# Patient Record
Sex: FEMALE | Race: WHITE | Employment: OTHER | URBAN - METROPOLITAN AREA
[De-identification: names, ages, dates, MRNs, and addresses within clinical notes are randomized per-mention and may not be internally consistent; named-entity substitution may affect disease eponyms.]

---

## 2018-02-23 ENCOUNTER — HOSPITAL ENCOUNTER (OUTPATIENT)
Dept: MAMMOGRAPHY | Age: 58
Discharge: HOME | End: 2018-02-23
Attending: FAMILY MEDICINE
Payer: COMMERCIAL

## 2018-02-23 DIAGNOSIS — Z12.31 VISIT FOR SCREENING MAMMOGRAM: ICD-10-CM

## 2018-02-23 PROCEDURE — 77063 BREAST TOMOSYNTHESIS BI: CPT

## 2018-07-11 ENCOUNTER — HOSPITAL ENCOUNTER (EMERGENCY)
Age: 58
Discharge: HOME | End: 2018-07-12
Attending: EMERGENCY MEDICINE
Payer: COMMERCIAL

## 2018-07-11 ENCOUNTER — APPOINTMENT (OUTPATIENT)
Dept: CT IMAGING | Age: 58
End: 2018-07-11
Attending: EMERGENCY MEDICINE
Payer: COMMERCIAL

## 2018-07-11 VITALS
SYSTOLIC BLOOD PRESSURE: 153 MMHG | OXYGEN SATURATION: 98 % | HEART RATE: 90 BPM | DIASTOLIC BLOOD PRESSURE: 85 MMHG | RESPIRATION RATE: 16 BRPM | TEMPERATURE: 97.9 F

## 2018-07-11 DIAGNOSIS — K63.9 LESION OF CECUM: ICD-10-CM

## 2018-07-11 DIAGNOSIS — H53.8 BLURRED VISION, BILATERAL: Primary | ICD-10-CM

## 2018-07-11 DIAGNOSIS — G89.29 ABDOMINAL PAIN, CHRONIC, RIGHT LOWER QUADRANT: ICD-10-CM

## 2018-07-11 DIAGNOSIS — R73.9 ACUTE HYPERGLYCEMIA: ICD-10-CM

## 2018-07-11 DIAGNOSIS — R10.31 ABDOMINAL PAIN, CHRONIC, RIGHT LOWER QUADRANT: ICD-10-CM

## 2018-07-11 LAB
ALBUMIN SERPL-MCNC: 3.8 G/DL (ref 3.4–5)
ALBUMIN/GLOB SERPL: 1 {RATIO} (ref 0.8–1.7)
ALP SERPL-CCNC: 74 U/L (ref 45–117)
ALT SERPL-CCNC: 50 U/L (ref 13–56)
ANION GAP SERPL CALC-SCNC: 10 MMOL/L (ref 3–18)
APPEARANCE UR: CLEAR
AST SERPL-CCNC: 15 U/L (ref 15–37)
BASOPHILS # BLD: 0 K/UL (ref 0–0.1)
BASOPHILS NFR BLD: 1 % (ref 0–2)
BILIRUB SERPL-MCNC: 0.3 MG/DL (ref 0.2–1)
BILIRUB UR QL: NEGATIVE
BUN SERPL-MCNC: 22 MG/DL (ref 7–18)
BUN/CREAT SERPL: 17 (ref 12–20)
CALCIUM SERPL-MCNC: 9.4 MG/DL (ref 8.5–10.1)
CHLORIDE SERPL-SCNC: 96 MMOL/L (ref 100–108)
CK MB CFR SERPL CALC: 1.3 % (ref 0–4)
CK MB SERPL-MCNC: 1.2 NG/ML (ref 5–25)
CK SERPL-CCNC: 96 U/L (ref 26–192)
CO2 SERPL-SCNC: 29 MMOL/L (ref 21–32)
COLOR UR: YELLOW
CREAT SERPL-MCNC: 1.33 MG/DL (ref 0.6–1.3)
DIFFERENTIAL METHOD BLD: NORMAL
EOSINOPHIL # BLD: 0.2 K/UL (ref 0–0.4)
EOSINOPHIL NFR BLD: 2 % (ref 0–5)
ERYTHROCYTE [DISTWIDTH] IN BLOOD BY AUTOMATED COUNT: 12.5 % (ref 11.6–14.5)
GLOBULIN SER CALC-MCNC: 3.9 G/DL (ref 2–4)
GLUCOSE BLD STRIP.AUTO-MCNC: 199 MG/DL (ref 70–110)
GLUCOSE SERPL-MCNC: 346 MG/DL (ref 74–99)
GLUCOSE UR STRIP.AUTO-MCNC: >1000 MG/DL
HCT VFR BLD AUTO: 41.9 % (ref 35–45)
HGB BLD-MCNC: 14.4 G/DL (ref 12–16)
HGB UR QL STRIP: NEGATIVE
KETONES UR QL STRIP.AUTO: NEGATIVE MG/DL
LEUKOCYTE ESTERASE UR QL STRIP.AUTO: NEGATIVE
LIPASE SERPL-CCNC: 889 U/L (ref 73–393)
LYMPHOCYTES # BLD: 3.2 K/UL (ref 0.9–3.6)
LYMPHOCYTES NFR BLD: 39 % (ref 21–52)
MCH RBC QN AUTO: 29.6 PG (ref 24–34)
MCHC RBC AUTO-ENTMCNC: 34.4 G/DL (ref 31–37)
MCV RBC AUTO: 86 FL (ref 74–97)
MONOCYTES # BLD: 0.5 K/UL (ref 0.05–1.2)
MONOCYTES NFR BLD: 6 % (ref 3–10)
NEUTS SEG # BLD: 4.4 K/UL (ref 1.8–8)
NEUTS SEG NFR BLD: 52 % (ref 40–73)
NITRITE UR QL STRIP.AUTO: NEGATIVE
PH UR STRIP: 5.5 [PH] (ref 5–8)
PLATELET # BLD AUTO: 257 K/UL (ref 135–420)
PMV BLD AUTO: 9.7 FL (ref 9.2–11.8)
POTASSIUM SERPL-SCNC: 3.7 MMOL/L (ref 3.5–5.5)
PROT SERPL-MCNC: 7.7 G/DL (ref 6.4–8.2)
PROT UR STRIP-MCNC: NEGATIVE MG/DL
RBC # BLD AUTO: 4.87 M/UL (ref 4.2–5.3)
SODIUM SERPL-SCNC: 135 MMOL/L (ref 136–145)
SP GR UR REFRACTOMETRY: 1.01 (ref 1–1.03)
TROPONIN I SERPL-MCNC: <0.02 NG/ML (ref 0–0.04)
TSH SERPL DL<=0.05 MIU/L-ACNC: 3.3 UIU/ML (ref 0.36–3.74)
UROBILINOGEN UR QL STRIP.AUTO: 0.2 EU/DL (ref 0.2–1)
WBC # BLD AUTO: 8.3 K/UL (ref 4.6–13.2)

## 2018-07-11 PROCEDURE — 74011636320 HC RX REV CODE- 636/320: Performed by: EMERGENCY MEDICINE

## 2018-07-11 PROCEDURE — 96361 HYDRATE IV INFUSION ADD-ON: CPT

## 2018-07-11 PROCEDURE — 93005 ELECTROCARDIOGRAM TRACING: CPT

## 2018-07-11 PROCEDURE — 83690 ASSAY OF LIPASE: CPT | Performed by: EMERGENCY MEDICINE

## 2018-07-11 PROCEDURE — 84443 ASSAY THYROID STIM HORMONE: CPT | Performed by: EMERGENCY MEDICINE

## 2018-07-11 PROCEDURE — 99284 EMERGENCY DEPT VISIT MOD MDM: CPT

## 2018-07-11 PROCEDURE — 96375 TX/PRO/DX INJ NEW DRUG ADDON: CPT

## 2018-07-11 PROCEDURE — 85025 COMPLETE CBC W/AUTO DIFF WBC: CPT | Performed by: EMERGENCY MEDICINE

## 2018-07-11 PROCEDURE — 74177 CT ABD & PELVIS W/CONTRAST: CPT

## 2018-07-11 PROCEDURE — 74011250636 HC RX REV CODE- 250/636: Performed by: EMERGENCY MEDICINE

## 2018-07-11 PROCEDURE — 74011636637 HC RX REV CODE- 636/637: Performed by: EMERGENCY MEDICINE

## 2018-07-11 PROCEDURE — 82962 GLUCOSE BLOOD TEST: CPT

## 2018-07-11 PROCEDURE — 80053 COMPREHEN METABOLIC PANEL: CPT | Performed by: EMERGENCY MEDICINE

## 2018-07-11 PROCEDURE — 74011000250 HC RX REV CODE- 250: Performed by: EMERGENCY MEDICINE

## 2018-07-11 PROCEDURE — 82550 ASSAY OF CK (CPK): CPT | Performed by: EMERGENCY MEDICINE

## 2018-07-11 PROCEDURE — 96374 THER/PROPH/DIAG INJ IV PUSH: CPT

## 2018-07-11 PROCEDURE — 81003 URINALYSIS AUTO W/O SCOPE: CPT | Performed by: EMERGENCY MEDICINE

## 2018-07-11 RX ORDER — GLIMEPIRIDE 4 MG/1
4 TABLET ORAL 2 TIMES DAILY
COMMUNITY
End: 2018-08-14

## 2018-07-11 RX ORDER — ONDANSETRON 2 MG/ML
4 INJECTION INTRAMUSCULAR; INTRAVENOUS
Status: COMPLETED | OUTPATIENT
Start: 2018-07-11 | End: 2018-07-11

## 2018-07-11 RX ORDER — LOSARTAN POTASSIUM AND HYDROCHLOROTHIAZIDE 12.5; 5 MG/1; MG/1
1 TABLET ORAL DAILY
COMMUNITY

## 2018-07-11 RX ORDER — FUROSEMIDE 40 MG/1
40 TABLET ORAL DAILY
COMMUNITY

## 2018-07-11 RX ORDER — FAMOTIDINE 10 MG/ML
20 INJECTION INTRAVENOUS
Status: COMPLETED | OUTPATIENT
Start: 2018-07-11 | End: 2018-07-11

## 2018-07-11 RX ADMIN — ONDANSETRON 4 MG: 2 INJECTION, SOLUTION INTRAMUSCULAR; INTRAVENOUS at 21:22

## 2018-07-11 RX ADMIN — SODIUM CHLORIDE 1000 ML: 900 INJECTION, SOLUTION INTRAVENOUS at 23:15

## 2018-07-11 RX ADMIN — INSULIN HUMAN 4 UNITS: 100 INJECTION, SOLUTION PARENTERAL at 22:41

## 2018-07-11 RX ADMIN — SODIUM CHLORIDE 1000 ML: 900 INJECTION, SOLUTION INTRAVENOUS at 21:21

## 2018-07-11 RX ADMIN — FAMOTIDINE 20 MG: 10 INJECTION, SOLUTION INTRAVENOUS at 21:22

## 2018-07-11 RX ADMIN — IOPAMIDOL 80 ML: 612 INJECTION, SOLUTION INTRAVENOUS at 22:53

## 2018-07-12 LAB
ATRIAL RATE: 79 BPM
CALCULATED P AXIS, ECG09: 17 DEGREES
CALCULATED R AXIS, ECG10: 27 DEGREES
CALCULATED T AXIS, ECG11: 46 DEGREES
DIAGNOSIS, 93000: NORMAL
P-R INTERVAL, ECG05: 132 MS
Q-T INTERVAL, ECG07: 372 MS
QRS DURATION, ECG06: 82 MS
QTC CALCULATION (BEZET), ECG08: 426 MS
VENTRICULAR RATE, ECG03: 79 BPM

## 2018-07-12 NOTE — ED TRIAGE NOTES
Pt arrived through triage with c/o blurred vision since Friday and abdominal pain that has been going on \"for months. \"

## 2018-07-12 NOTE — ED NOTES
I have reviewed discharge instruction and prescriptions with patient. Patient verbalized understanding and has no further questions at this time. Education taught and patient verbalized understanding of education. Teach back method used. Right ac IV removed, catheter tip intact on removal.  Armband removed and shredded per patients request.    Patients pain 1/10. Belongings given to patient. Patient discharged with spouse to home.

## 2018-07-12 NOTE — ED PROVIDER NOTES
HPI Comments: Cornell Bentley is a 62 y.o. Female with h/o niddm with c/o continued bilateral blurred vision for last 4 days with persistent nausea and daily diarrhea. Pt with polyuria, and chronic right lower abd pain (last several weeks with no imaging resulting in diff to get her right leg up on bed everyday. No syncope. Also with intermittent left sided chest pain as well. No recent gupta. Had similar issue with her vision prior to started meds for dm but cleared up once she got her glucose down The history is provided by the patient and medical records. Past Medical History:  
Diagnosis Date  Diabetes ([facility] [address]  Hypertension  Seizures ([facility] [address] Past Surgical History:  
Procedure Laterality Date  HX TUBAL LIGATION No family history on file. Social History Social History  Marital status: SINGLE Spouse name: N/A  
 Number of children: N/A  
 Years of education: N/A Occupational History  Not on file. Social History Main Topics  Smoking status: Former Smoker  Smokeless tobacco: Not on file  Alcohol use No  
 Drug use: No  
 Sexual activity: Not on file Other Topics Concern  Not on file Social History Narrative ALLERGIES: Ciprofloxacin; Penicillins; and Percocet [oxycodone-acetaminophen] Review of Systems Constitutional: Positive for chills and fatigue. Negative for fever. HENT: Negative for sore throat and trouble swallowing. Eyes: Positive for visual disturbance. Negative for discharge and redness. Respiratory: Negative for cough. Cardiovascular: Positive for chest pain. Gastrointestinal: Positive for abdominal pain. Negative for blood in stool. Endocrine: Positive for polyuria. Genitourinary: Positive for frequency. Negative for difficulty urinating. Musculoskeletal: Negative for gait problem. Skin: Negative for rash. Allergic/Immunologic: Negative for immunocompromised state.   
Neurological: Positive for light-headedness. Psychiatric/Behavioral: Negative for sleep disturbance. Vitals:  
 07/11/18 2020 BP: 153/85 Pulse: 90 Resp: 16 Temp: 97.9 °F (36.6 °C) SpO2: 98% Physical Exam  
Constitutional: She is oriented to person, place, and time. She appears well-developed and well-nourished. No distress. HENT:  
Head: Normocephalic and atraumatic. Right Ear: External ear normal.  
Left Ear: External ear normal.  
Nose: Nose normal.  
Mouth/Throat: Uvula is midline, oropharynx is clear and moist and mucous membranes are normal.  
Eyes: Conjunctivae are normal. No scleral icterus. Neck: Neck supple. Cardiovascular: Normal rate, regular rhythm, normal heart sounds and intact distal pulses. Pulmonary/Chest: Effort normal and breath sounds normal.  
Abdominal: Soft. She exhibits no distension and no mass. There is no hepatosplenomegaly or hepatomegaly. There is tenderness. There is no rigidity, no rebound, no guarding and no CVA tenderness. Obese Musculoskeletal: She exhibits no edema. Neurological: She is alert and oriented to person, place, and time. Gait normal.  
Skin: Skin is warm and dry. She is not diaphoretic. Psychiatric: Her behavior is normal.  
Nursing note and vitals reviewed. [facility] 
 
 
ED Course Procedures Vitals: 
Patient Vitals for the past 12 hrs: 
 Temp Pulse Resp BP SpO2  
07/11/18 2020 97.9 °F (36.6 °C) 90 16 153/85 98 % Medications ordered:  
Medications  
sodium chloride 0.9 % bolus infusion 1,000 mL (not administered)  
sodium chloride 0.9 % bolus infusion 1,000 mL (1,000 mL IntraVENous New Bag 7/11/18 2121) ondansetron [facility]) injection 4 mg (4 mg IntraVENous Given 7/11/18 2122)  
famotidine (PF) (PEPCID) injection 20 mg (20 mg IntraVENous Given 7/11/18 2122) insulin regular (NOVOLIN R, HUMULIN R) injection 4 Units (4 Units IntraVENous Given 7/11/18 2241) iopamidol (ISOVUE 300) 61 % contrast injection 100 mL (80 mL IntraVENous Given 7/11/18 0597) Lab findings: 
Recent Results (from the past 12 hour(s)) URINALYSIS W/ RFLX MICROSCOPIC Collection Time: 07/11/18  8:40 PM  
Result Value Ref Range Color YELLOW Appearance CLEAR Specific gravity 1.013 1.005 - 1.030    
 pH (UA) 5.5 5.0 - 8.0 Protein NEGATIVE  NEG mg/dL Glucose >1000 (A) NEG mg/dL Ketone NEGATIVE  NEG mg/dL Bilirubin NEGATIVE  NEG Blood NEGATIVE  NEG Urobilinogen 0.2 0.2 - 1.0 EU/dL Nitrites NEGATIVE  NEG Leukocyte Esterase NEGATIVE  NEG    
CBC WITH AUTOMATED DIFF Collection Time: 07/11/18  9:07 PM  
Result Value Ref Range WBC 8.3 4.6 - 13.2 K/uL  
 RBC 4.87 4.20 - 5.30 M/uL  
 HGB 14.4 12.0 - 16.0 g/dL HCT 41.9 35.0 - 45.0 % MCV 86.0 74.0 - 97.0 FL  
 MCH 29.6 24.0 - 34.0 PG  
 MCHC 34.4 31.0 - 37.0 g/dL  
 RDW 12.5 11.6 - 14.5 % PLATELET 287 457 - 126 K/uL MPV 9.7 9.2 - 11.8 FL  
 NEUTROPHILS 52 40 - 73 % LYMPHOCYTES 39 21 - 52 % MONOCYTES 6 3 - 10 % EOSINOPHILS 2 0 - 5 % BASOPHILS 1 0 - 2 %  
 ABS. NEUTROPHILS 4.4 1.8 - 8.0 K/UL  
 ABS. LYMPHOCYTES 3.2 0.9 - 3.6 K/UL  
 ABS. MONOCYTES 0.5 0.05 - 1.2 K/UL  
 ABS. EOSINOPHILS 0.2 0.0 - 0.4 K/UL  
 ABS. BASOPHILS 0.0 0.0 - 0.1 K/UL  
 DF AUTOMATED METABOLIC PANEL, COMPREHENSIVE Collection Time: 07/11/18  9:07 PM  
Result Value Ref Range Sodium 135 (L) 136 - 145 mmol/L Potassium 3.7 3.5 - 5.5 mmol/L Chloride 96 (L) 100 - 108 mmol/L  
 CO2 29 21 - 32 mmol/L Anion gap 10 3.0 - 18 mmol/L Glucose 346 (H) 74 - 99 mg/dL BUN 22 (H) 7.0 - 18 MG/DL Creatinine 1.33 (H) 0.6 - 1.3 MG/DL  
 BUN/Creatinine ratio 17 12 - 20 GFR est AA 50 (L) >60 ml/min/1.73m2 GFR est non-AA 41 (L) >60 ml/min/1.73m2 Calcium 9.4 8.5 - 10.1 MG/DL Bilirubin, total 0.3 0.2 - 1.0 MG/DL  
 ALT (SGPT) 50 13 - 56 U/L  
 AST (SGOT) 15 15 - 37 U/L Alk. phosphatase 74 45 - 117 U/L Protein, total 7.7 6.4 - 8.2 g/dL  Albumin 3.8 3.4 - 5.0 g/dL Globulin 3.9 2.0 - 4.0 g/dL A-G Ratio 1.0 0.8 - 1.7 LIPASE Collection Time: 07/11/18  9:07 PM  
Result Value Ref Range Lipase 889 (H) 73 - 393 U/L  
CARDIAC PANEL,(CK, CKMB & TROPONIN) Collection Time: 07/11/18  9:07 PM  
Result Value Ref Range CK 96 26 - 192 U/L  
 CK - MB 1.2 <3.6 ng/ml CK-MB Index 1.3 0.0 - 4.0 % Troponin-I, Qt. <0.02 0.0 - 0.045 NG/ML  
TSH 3RD GENERATION Collection Time: 07/11/18  9:07 PM  
Result Value Ref Range TSH 3.30 0.36 - 3.74 uIU/mL EKG, 12 LEAD, INITIAL Collection Time: 07/11/18  9:32 PM  
Result Value Ref Range Ventricular Rate 79 BPM  
 Atrial Rate 79 BPM  
 P-R Interval 132 ms QRS Duration 82 ms Q-T Interval 372 ms QTC Calculation (Bezet) 426 ms Calculated P Axis 17 degrees Calculated R Axis 27 degrees Calculated T Axis 46 degrees Diagnosis Normal sinus rhythm Normal ECG When compared with ECG of 04-OCT-2016 18:21, No significant change was found GLUCOSE, POC Collection Time: 07/11/18 11:44 PM  
Result Value Ref Range Glucose (POC) 199 (H) 70 - 110 mg/dL EKG interpretation by ED Physician: 
nsr with no acute st tw changes Rate 79, pr 132, qtc 426 No sig change from previous X-Ray, CT or other radiology findings or impressions: 
CT ABD PELV W CONT    (Results Pending) Appendix not visualized but no sec changes. There is abnl appearance to cecum without mass Progress notes, Consult notes or additional Procedure notes:  
Doubt need for other work here, abx. Suspect her vision due to chronic uncontrolled hyperglycemia Aware of recent colonoscopy but pt needs to f/u back with gi for possible repeat endoscopy to see if bx needed in that area Pt with appt with endo upcoming I have discussed with patient and/or family/sig other the results, interpretation of any imaging if performed, suspected diagnosis and treatment plan to include instructions regarding the diagnoses listed to which understanding was expressed with all questions answered Reevaluation of patient:  
stable Disposition: 
Diagnosis: 1. Blurred vision, bilateral   
2. Acute hyperglycemia 3. Abdominal pain, chronic, right lower quadrant 4. Lesion of cecum Disposition: home Follow-up Information Follow up With Details Comments Contact Sussy Almaguer MD Schedule an appointment as soon as possible for a visit for follow up on your diabetes within next week [address] [facility] 
[phone] 
  
 make an appointment with your eye doctor within the next week for recheck of your eyes     
 call your GI doctor in the morning to arrange recheck in the office for follow up on the abnormal appearance of your cecum on the CT scan Patient's Medications Start Taking No medications on file Continue Taking FUROSEMIDE (LASIX) 40 MG TABLET    Take 40 mg by mouth daily. Indications: Edema, hypertension GLIMEPIRIDE (AMARYL) 4 MG TABLET    Take 4 mg by mouth two (2) times a day. LOSARTAN-HYDROCHLOROTHIAZIDE (HYZAAR) 50-12.5 MG PER TABLET    Take 1 Tab by mouth daily. Indications: hypertension SITAGLIPTIN-METFORMIN (JANUMET) 50-1,000 MG PER TABLET    Take 1 Tab by mouth two (2) times daily (with meals). These Medications have changed No medications on file Stop Taking No medications on file

## 2018-07-31 ENCOUNTER — HOSPITAL ENCOUNTER (OUTPATIENT)
Dept: MRI IMAGING | Age: 58
Discharge: HOME | End: 2018-07-31
Attending: FAMILY MEDICINE

## 2018-07-31 DIAGNOSIS — R29.898 WEAKNESS OF RIGHT LEG: ICD-10-CM

## 2018-08-02 ENCOUNTER — HOSPITAL ENCOUNTER (OUTPATIENT)
Dept: MRI IMAGING | Age: 58
Discharge: HOME | End: 2018-08-02
Attending: FAMILY MEDICINE
Payer: COMMERCIAL

## 2018-08-02 PROCEDURE — 72148 MRI LUMBAR SPINE W/O DYE: CPT

## 2018-08-14 ENCOUNTER — HOSPITAL ENCOUNTER (OUTPATIENT)
Dept: PREADMISSION TESTING | Age: 58
Discharge: HOME | End: 2018-08-14
Payer: COMMERCIAL

## 2018-08-14 ENCOUNTER — HOSPITAL ENCOUNTER (OUTPATIENT)
Dept: LAB | Age: 58
Discharge: HOME | End: 2018-08-14
Payer: COMMERCIAL

## 2018-08-14 DIAGNOSIS — Z01.818 PRE-OP TESTING: ICD-10-CM

## 2018-08-14 LAB
ABO + RH BLD: NORMAL
BLOOD GROUP ANTIBODIES SERPL: NORMAL
SENTARA SPECIMEN COL,SENBCF: NORMAL
SPECIMEN EXP DATE BLD: NORMAL

## 2018-08-14 PROCEDURE — 86900 BLOOD TYPING SEROLOGIC ABO: CPT | Performed by: OBSTETRICS & GYNECOLOGY

## 2018-08-14 PROCEDURE — 99001 SPECIMEN HANDLING PT-LAB: CPT | Performed by: OBSTETRICS & GYNECOLOGY

## 2018-08-14 RX ORDER — SODIUM CHLORIDE 0.9 % (FLUSH) 0.9 %
5-10 SYRINGE (ML) INJECTION EVERY 8 HOURS
Status: CANCELLED | OUTPATIENT
Start: 2018-08-14

## 2018-08-14 RX ORDER — SODIUM CHLORIDE, SODIUM LACTATE, POTASSIUM CHLORIDE, CALCIUM CHLORIDE 600; 310; 30; 20 MG/100ML; MG/100ML; MG/100ML; MG/100ML
150 INJECTION, SOLUTION INTRAVENOUS CONTINUOUS
Status: CANCELLED | OUTPATIENT
Start: 2018-08-22 | End: 2018-08-23

## 2018-08-14 RX ORDER — METFORMIN HYDROCHLORIDE 1000 MG/1
1000 TABLET ORAL 2 TIMES DAILY WITH MEALS
COMMUNITY

## 2018-08-14 RX ORDER — SODIUM CHLORIDE 0.9 % (FLUSH) 0.9 %
5-10 SYRINGE (ML) INJECTION AS NEEDED
Status: CANCELLED | OUTPATIENT
Start: 2018-08-14

## 2018-08-14 NOTE — PERIOP NOTES
PAT - SURGICAL PRE-ADMISSION INSTRUCTIONS    NAME:  Tamica Choe                                                          TODAY'S DATE:  8/14/2018    SURGERY DATE:  8/22/2018                                  SURGERY ARRIVAL TIME:   0545    1. Do NOT eat or drink anything, including candy or gum, after MIDNIGHT on 8/21/18 , unless you have specific instructions from your Surgeon or Anesthesia Provider to do so. 2. No smoking on the day of surgery. 3. No alcohol 24 hours prior to the day of surgery. 4. No recreational drugs for one week prior to the day of surgery. 5. Leave all valuables, including money/purse, at home. 6. Remove all jewelry, nail polish, makeup (including mascara); no lotions, powders, deodorant, or perfume/cologne/after shave. 7. Glasses/Contact lenses and Dentures may be worn to the hospital.  They will be removed prior to surgery. 8. Call your doctor if symptoms of a cold or illness develop within 24 ours prior to surgery. 9. AN ADULT MUST DRIVE YOU HOME AFTER OUTPATIENT SURGERY. 10. If you are having an OUTPATIENT procedure, please make arrangements for a responsible adult to be with you for 24 hours after your surgery. 11. If you are admitted to the hospital, you will be assigned to a bed after surgery is complete. Normally a family member will not be able to see you until you are in your assigned bed. 15. Family is encouraged to accompany you to the hospital.  We ask visitors in the treatment area to be limited to ONE person at a time to ensure patient privacy. EXCEPTIONS WILL BE MADE AS NEEDED. 15. Children under 12 are discouraged from entering the treatment area and need to be supervised by an adult when in the waiting room. Special Instructions:    HOLD oral diabetic medication on the MORNING OF surgery. , HOLD metformin/glucophage dose starting the EVENING BEFORE the day of surgery.     Patient Prep:    use CHG solution    These surgical instructions were reviewed with patient during the PAT visit. A printed copy of the instructions was provided to patient. Directions: On the morning of surgery, please go to the [facility]. Enter the building from the [facility] entrance, 1st floor (next to the Emergency Room entrance). Take the elevator to the 2nd floor. Sign in at the Registration Desk.     If you have any questions and/or concerns, please do not hesitate to call:  (Prior to the day of surgery)  [facility] unit:  [phone]  (Day of surgery)  [facility] unit:  [phone]

## 2018-08-21 ENCOUNTER — ANESTHESIA EVENT (OUTPATIENT)
Dept: SURGERY | Age: 58
End: 2018-08-21
Payer: COMMERCIAL

## 2018-08-21 NOTE — H&P
[facility]  HISTORY AND PHYSICAL      Cortez Lobato  MR#: [id number]  : 1960  ACCOUNT #: [de-identified]   ADMIT DATE: 2018    ADMITTING DIAGNOSIS:  Vulvar lesion. BRIEF HISTORY OF PRESENT ILLNESS:  The patient is a 14-year-old female  3, para 2, AB 1 who has been menopausal since age 54 and then been on hormone replacement who presents for a wide local excision of the right vulva. The patient's history is such that she presented as a new patient as of ; At that point in time, noted raised lesion or possible condyloma. Given the fact she has a history of dysplasia and cone biopsy of the cervix in . A biopsy was done at that point in time which came back vulvar dysplasia or VIN3. Patient returned for colposcopy to evaluate the entire vulva and no other lesions were seen, therefore thought that she would do well with a wide local excision of that area. She has been counseled regarding the risks and benefits of the procedure as well as risk of bleeding and infection. We also discussed that this can often be a recurrent issue. GYNECOLOGIC HISTORY:  Menopause at age 54, which was really uneventful and not on any hormone replacement. There is a history of abnormal Pap in the past as previously mentioned, cone biopsy in  with normal Paps since that time. Her last Pap smear in 2018 showed ASCUS but high risk HPV was negative. OBSTETRICAL HISTORY:  She has had 3 pregnancies, two at term vaginal deliveries and 1 miscarriage. PAST SURGICAL HISTORY:  Includes a tubal ligation in , D and C in , cervical cone biopsy in . ALLERGIES:  ALLERGIC TO PENICILLIN AND PERCOCET. She is unsure of reaction to either of these. MEDICATIONS:  Include Hyzaar 50/12.5 mg daily, Lasix 40 mg daily, metformin 1000 mg twice a day, Ozempic 1 mg/0.75 mL subcutaneous pen injector.     PAST MEDICAL HISTORY:  Includes a history of depression, was treated with Prozac in the past.  She has a history of diabetes, hypertension and seizure disorder in the past and has not had any seizures since her second child. REVIEW OF SYSTEMS:  Denies chest pain, shortness of breath, changes in bladder or bowel habits. FAMILY HISTORY:  Significant for diabetes in her mother. Father had a stroke. SOCIAL HISTORY:  She is a former smoker. She denies alcohol abuse. She is . PHYSICAL EXAMINATION:  VITAL SIGNS:  Height is 5 feet 3-1/2 inches, weight 220 pounds. Her BMI is 38, blood pressure is 128/74. LUNGS:  Clear. HEART:  Regular rate and rhythm. ABDOMEN:  Soft, is obese. No masses noted. PELVIC:  The external genitalia on the right labia majora, she has a 1.5 cm raised lesion with irregular edges and 2 flat nevi about 7 mm each. Vagina is without lesions. Cervix without gross lesions. Uterus normal size and shape. Adnexa no masses noted. IMPRESSION:  VIN3 of the right labia with no other lesion seen on colposcopy. PLAN:  The patient is scheduled for wide local excision. Risks and benefits of procedure have been discussed with her. All of her questions have been answered.       Zoraida Espinal MD MD/MN  D: 08/20/2018 22:09     T: 08/20/2018 23:59  JOB #: [id number]

## 2018-08-22 ENCOUNTER — HOSPITAL ENCOUNTER (OUTPATIENT)
Age: 58
Setting detail: OUTPATIENT SURGERY
Discharge: HOME | End: 2018-08-22
Attending: OBSTETRICS & GYNECOLOGY | Admitting: OBSTETRICS & GYNECOLOGY
Payer: COMMERCIAL

## 2018-08-22 ENCOUNTER — ANESTHESIA (OUTPATIENT)
Dept: SURGERY | Age: 58
End: 2018-08-22
Payer: COMMERCIAL

## 2018-08-22 VITALS
TEMPERATURE: 98.3 F | BODY MASS INDEX: 38.8 KG/M2 | OXYGEN SATURATION: 95 % | HEIGHT: 63 IN | SYSTOLIC BLOOD PRESSURE: 141 MMHG | DIASTOLIC BLOOD PRESSURE: 73 MMHG | HEART RATE: 80 BPM | RESPIRATION RATE: 14 BRPM | WEIGHT: 219 LBS

## 2018-08-22 DIAGNOSIS — D07.1 VIN III (VULVAR INTRAEPITHELIAL NEOPLASIA III): Primary | ICD-10-CM

## 2018-08-22 LAB
BUN BLD-MCNC: 20 MG/DL (ref 7–18)
CHLORIDE BLD-SCNC: 95 MMOL/L (ref 100–108)
GLUCOSE BLD STRIP.AUTO-MCNC: 297 MG/DL (ref 70–110)
GLUCOSE BLD STRIP.AUTO-MCNC: 318 MG/DL (ref 74–106)
HCT VFR BLD CALC: 40 % (ref 36–49)
HGB BLD-MCNC: 13.6 G/DL (ref 12–16)
POTASSIUM BLD-SCNC: 3.7 MMOL/L (ref 3.5–5.5)
SODIUM BLD-SCNC: 137 MMOL/L (ref 136–145)

## 2018-08-22 PROCEDURE — 74011250636 HC RX REV CODE- 250/636: Performed by: NURSE ANESTHETIST, CERTIFIED REGISTERED

## 2018-08-22 PROCEDURE — 74011250636 HC RX REV CODE- 250/636

## 2018-08-22 PROCEDURE — 74011250637 HC RX REV CODE- 250/637: Performed by: NURSE ANESTHETIST, CERTIFIED REGISTERED

## 2018-08-22 PROCEDURE — 77030032490 HC SLV COMPR SCD KNE COVD -B: Performed by: OBSTETRICS & GYNECOLOGY

## 2018-08-22 PROCEDURE — 76060000032 HC ANESTHESIA 0.5 TO 1 HR: Performed by: OBSTETRICS & GYNECOLOGY

## 2018-08-22 PROCEDURE — 77030018842 HC SOL IRR SOD CL 9% BAXT -A: Performed by: OBSTETRICS & GYNECOLOGY

## 2018-08-22 PROCEDURE — 77030012510 HC MSK AIRWY LMA TELE -B: Performed by: ANESTHESIOLOGY

## 2018-08-22 PROCEDURE — 74011636637 HC RX REV CODE- 636/637

## 2018-08-22 PROCEDURE — 76210000006 HC OR PH I REC 0.5 TO 1 HR: Performed by: OBSTETRICS & GYNECOLOGY

## 2018-08-22 PROCEDURE — 77030031139 HC SUT VCRL2 J&J -A: Performed by: OBSTETRICS & GYNECOLOGY

## 2018-08-22 PROCEDURE — 88305 TISSUE EXAM BY PATHOLOGIST: CPT | Performed by: OBSTETRICS & GYNECOLOGY

## 2018-08-22 PROCEDURE — 74011000250 HC RX REV CODE- 250: Performed by: OBSTETRICS & GYNECOLOGY

## 2018-08-22 PROCEDURE — 76010000160 HC OR TIME 0.5 TO 1 HR INTENSV-TIER 1: Performed by: OBSTETRICS & GYNECOLOGY

## 2018-08-22 PROCEDURE — 84295 ASSAY OF SERUM SODIUM: CPT

## 2018-08-22 PROCEDURE — 74011636637 HC RX REV CODE- 636/637: Performed by: NURSE ANESTHETIST, CERTIFIED REGISTERED

## 2018-08-22 PROCEDURE — 76210000020 HC REC RM PH II FIRST 0.5 HR: Performed by: OBSTETRICS & GYNECOLOGY

## 2018-08-22 PROCEDURE — 77030002933 HC SUT MCRYL J&J -A: Performed by: OBSTETRICS & GYNECOLOGY

## 2018-08-22 PROCEDURE — 77030010507 HC ADH SKN DERMBND J&J -B: Performed by: OBSTETRICS & GYNECOLOGY

## 2018-08-22 PROCEDURE — 82962 GLUCOSE BLOOD TEST: CPT

## 2018-08-22 RX ORDER — SODIUM CHLORIDE 0.9 % (FLUSH) 0.9 %
5-10 SYRINGE (ML) INJECTION AS NEEDED
Status: DISCONTINUED | OUTPATIENT
Start: 2018-08-22 | End: 2018-08-22 | Stop reason: HOSPADM

## 2018-08-22 RX ORDER — ONDANSETRON 2 MG/ML
4 INJECTION INTRAMUSCULAR; INTRAVENOUS ONCE
Status: DISCONTINUED | OUTPATIENT
Start: 2018-08-22 | End: 2018-08-22 | Stop reason: HOSPADM

## 2018-08-22 RX ORDER — KETOROLAC TROMETHAMINE 30 MG/ML
INJECTION, SOLUTION INTRAMUSCULAR; INTRAVENOUS AS NEEDED
Status: DISCONTINUED | OUTPATIENT
Start: 2018-08-22 | End: 2018-08-22 | Stop reason: HOSPADM

## 2018-08-22 RX ORDER — INSULIN LISPRO 100 [IU]/ML
INJECTION, SOLUTION INTRAVENOUS; SUBCUTANEOUS ONCE
Status: COMPLETED | OUTPATIENT
Start: 2018-08-22 | End: 2018-08-22

## 2018-08-22 RX ORDER — MIDAZOLAM HYDROCHLORIDE 1 MG/ML
INJECTION, SOLUTION INTRAMUSCULAR; INTRAVENOUS AS NEEDED
Status: DISCONTINUED | OUTPATIENT
Start: 2018-08-22 | End: 2018-08-22 | Stop reason: HOSPADM

## 2018-08-22 RX ORDER — SODIUM CHLORIDE, SODIUM LACTATE, POTASSIUM CHLORIDE, CALCIUM CHLORIDE 600; 310; 30; 20 MG/100ML; MG/100ML; MG/100ML; MG/100ML
50 INJECTION, SOLUTION INTRAVENOUS CONTINUOUS
Status: DISCONTINUED | OUTPATIENT
Start: 2018-08-22 | End: 2018-08-22 | Stop reason: HOSPADM

## 2018-08-22 RX ORDER — ONDANSETRON 2 MG/ML
INJECTION INTRAMUSCULAR; INTRAVENOUS AS NEEDED
Status: DISCONTINUED | OUTPATIENT
Start: 2018-08-22 | End: 2018-08-22 | Stop reason: HOSPADM

## 2018-08-22 RX ORDER — FENTANYL CITRATE 50 UG/ML
INJECTION, SOLUTION INTRAMUSCULAR; INTRAVENOUS AS NEEDED
Status: DISCONTINUED | OUTPATIENT
Start: 2018-08-22 | End: 2018-08-22 | Stop reason: HOSPADM

## 2018-08-22 RX ORDER — MAGNESIUM SULFATE 100 %
4 CRYSTALS MISCELLANEOUS AS NEEDED
Status: DISCONTINUED | OUTPATIENT
Start: 2018-08-22 | End: 2018-08-22 | Stop reason: HOSPADM

## 2018-08-22 RX ORDER — IBUPROFEN 600 MG/1
600 TABLET ORAL
Qty: 20 TAB | Refills: 0 | Status: SHIPPED
Start: 2018-08-22 | End: 2018-10-16

## 2018-08-22 RX ORDER — PROPOFOL 10 MG/ML
INJECTION, EMULSION INTRAVENOUS AS NEEDED
Status: DISCONTINUED | OUTPATIENT
Start: 2018-08-22 | End: 2018-08-22 | Stop reason: HOSPADM

## 2018-08-22 RX ORDER — FAMOTIDINE 20 MG/1
20 TABLET, FILM COATED ORAL ONCE
Status: COMPLETED | OUTPATIENT
Start: 2018-08-22 | End: 2018-08-22

## 2018-08-22 RX ORDER — SODIUM CHLORIDE, SODIUM LACTATE, POTASSIUM CHLORIDE, CALCIUM CHLORIDE 600; 310; 30; 20 MG/100ML; MG/100ML; MG/100ML; MG/100ML
150 INJECTION, SOLUTION INTRAVENOUS CONTINUOUS
Status: DISCONTINUED | OUTPATIENT
Start: 2018-08-22 | End: 2018-08-22 | Stop reason: HOSPADM

## 2018-08-22 RX ORDER — DEXTROSE MONOHYDRATE 25 G/50ML
25-50 INJECTION, SOLUTION INTRAVENOUS AS NEEDED
Status: DISCONTINUED | OUTPATIENT
Start: 2018-08-22 | End: 2018-08-22 | Stop reason: HOSPADM

## 2018-08-22 RX ORDER — TRAMADOL HYDROCHLORIDE 50 MG/1
50 TABLET ORAL
Qty: 20 TAB | Refills: 0 | Status: SHIPPED
Start: 2018-08-22 | End: 2018-10-16

## 2018-08-22 RX ORDER — SODIUM CHLORIDE 0.9 % (FLUSH) 0.9 %
5-10 SYRINGE (ML) INJECTION EVERY 8 HOURS
Status: DISCONTINUED | OUTPATIENT
Start: 2018-08-22 | End: 2018-08-22 | Stop reason: HOSPADM

## 2018-08-22 RX ORDER — INSULIN LISPRO 100 [IU]/ML
INJECTION, SOLUTION INTRAVENOUS; SUBCUTANEOUS
Status: COMPLETED
Start: 2018-08-22 | End: 2018-08-22

## 2018-08-22 RX ORDER — BUPIVACAINE HYDROCHLORIDE AND EPINEPHRINE 2.5; 5 MG/ML; UG/ML
INJECTION, SOLUTION EPIDURAL; INFILTRATION; INTRACAUDAL; PERINEURAL AS NEEDED
Status: DISCONTINUED | OUTPATIENT
Start: 2018-08-22 | End: 2018-08-22 | Stop reason: HOSPADM

## 2018-08-22 RX ORDER — LIDOCAINE HYDROCHLORIDE 20 MG/ML
INJECTION, SOLUTION EPIDURAL; INFILTRATION; INTRACAUDAL; PERINEURAL AS NEEDED
Status: DISCONTINUED | OUTPATIENT
Start: 2018-08-22 | End: 2018-08-22 | Stop reason: HOSPADM

## 2018-08-22 RX ORDER — FENTANYL CITRATE 50 UG/ML
50 INJECTION, SOLUTION INTRAMUSCULAR; INTRAVENOUS AS NEEDED
Status: DISCONTINUED | OUTPATIENT
Start: 2018-08-22 | End: 2018-08-22 | Stop reason: HOSPADM

## 2018-08-22 RX ORDER — HYDROMORPHONE HYDROCHLORIDE 2 MG/ML
0.5 INJECTION, SOLUTION INTRAMUSCULAR; INTRAVENOUS; SUBCUTANEOUS
Status: DISCONTINUED | OUTPATIENT
Start: 2018-08-22 | End: 2018-08-22 | Stop reason: HOSPADM

## 2018-08-22 RX ADMIN — PROPOFOL 50 MG: 10 INJECTION, EMULSION INTRAVENOUS at 07:38

## 2018-08-22 RX ADMIN — FENTANYL CITRATE 50 MCG: 50 INJECTION, SOLUTION INTRAMUSCULAR; INTRAVENOUS at 07:38

## 2018-08-22 RX ADMIN — INSULIN LISPRO 9 UNITS: 100 INJECTION, SOLUTION INTRAVENOUS; SUBCUTANEOUS at 08:20

## 2018-08-22 RX ADMIN — FENTANYL CITRATE 50 MCG: 50 INJECTION, SOLUTION INTRAMUSCULAR; INTRAVENOUS at 07:43

## 2018-08-22 RX ADMIN — MIDAZOLAM HYDROCHLORIDE 2 MG: 1 INJECTION, SOLUTION INTRAMUSCULAR; INTRAVENOUS at 07:28

## 2018-08-22 RX ADMIN — SODIUM CHLORIDE, SODIUM LACTATE, POTASSIUM CHLORIDE, AND CALCIUM CHLORIDE 50 ML/HR: 600; 310; 30; 20 INJECTION, SOLUTION INTRAVENOUS at 07:08

## 2018-08-22 RX ADMIN — ONDANSETRON 4 MG: 2 INJECTION INTRAMUSCULAR; INTRAVENOUS at 07:44

## 2018-08-22 RX ADMIN — INSULIN LISPRO 8 UNITS: 100 INJECTION, SOLUTION INTRAVENOUS; SUBCUTANEOUS at 07:17

## 2018-08-22 RX ADMIN — LIDOCAINE HYDROCHLORIDE 60 MG: 20 INJECTION, SOLUTION EPIDURAL; INFILTRATION; INTRACAUDAL; PERINEURAL at 07:34

## 2018-08-22 RX ADMIN — PROPOFOL 150 MG: 10 INJECTION, EMULSION INTRAVENOUS at 07:34

## 2018-08-22 RX ADMIN — KETOROLAC TROMETHAMINE 15 MG: 30 INJECTION, SOLUTION INTRAMUSCULAR; INTRAVENOUS at 08:00

## 2018-08-22 RX ADMIN — FAMOTIDINE 20 MG: 20 TABLET ORAL at 06:56

## 2018-08-22 NOTE — ANESTHESIA POSTPROCEDURE EVALUATION
Post-Anesthesia Evaluation and Assessment    Patient: Alina Lozano MRN: [id number]  SSN: [id number]    YOB: 1960  Age: 62 y.o. Sex: female       Cardiovascular Function/Vital Signs  Visit Vitals    /73    Pulse 80    Temp 36.8 °C (98.3 °F)    Resp 14    Ht 5' 3\" (1.6 m)    Wt 99.3 kg (219 lb)    SpO2 95%    BMI 38.79 kg/m2       Patient is status post general anesthesia for Procedure(s):  WIDE EXCISION OF VULVA. Nausea/Vomiting: None    Postoperative hydration reviewed and adequate. Pain:  Pain Scale 1: Numeric (0 - 10) (08/22/18 0851)  Pain Intensity 1: 0 (08/22/18 0851)   Managed    Neurological Status:   Neuro (WDL): Within Defined Limits (08/22/18 0829)  Neuro  Neurologic State: Drowsy (08/22/18 3402)  Orientation Level: Oriented X4 (08/22/18 0829)   At baseline    Mental Status and Level of Consciousness: Alert and oriented     Pulmonary Status:   O2 Device: Room air (08/22/18 0829)   Adequate oxygenation and airway patent    Complications related to anesthesia: None    Post-anesthesia assessment completed.  No concerns    Signed By: Vinetta Bloch, CRNA     August 22, 2018

## 2018-08-22 NOTE — PROGRESS NOTES
conducted a pre-surgery visit with Beverly Arzate, who is a 62 y.o.,female. The  provided the following Interventions:  Initiated a relationship of care and support. Offered prayer and assurance of continued prayers on patient's behalf. Plan:  Chaplains will continue to follow and will provide pastoral care on an as needed/requested basis.  recommends bedside caregivers page  on duty if patient shows signs of acute spiritual or emotional distress.     Alaln Hernandez   Spiritual Care   [phone]

## 2018-08-22 NOTE — ANESTHESIA PREPROCEDURE EVALUATION
Anesthetic History   No history of anesthetic complications            Review of Systems / Medical History  Patient summary reviewed and pertinent labs reviewed    Pulmonary                   Neuro/Psych           Pertinent negatives: Seizures: Childhood. None as adult. Cardiovascular    Hypertension: well controlled              Exercise tolerance: >4 METS     GI/Hepatic/Renal                Endo/Other    Diabetes: type 2    Morbid obesity     Other Findings   Comments: Documentation of current medication  Current medications obtained, documented and obtained? YES      Risk Factors for Postoperative nausea/vomiting:       History of postoperative nausea/vomiting? NO       Female? YES       Motion sickness? NO       Intended opioid administration for postoperative analgesia? YES      Smoking Abstinence:  Current Smoker? NO  Elective Surgery? YES  Seen preoperatively by anesthesiologist or proxy prior to day of surgery? YES  Pt abstained from smoking 24 hours prior to anesthesia?  N/A    Preventive care/screening for High Blood Pressure:  Aged 18 years and older: YES  Screened for high blood pressure: YES  Patients with high blood pressure referred to primary care provider   for BP management: YES                 Physical Exam    Airway  Mallampati: II  TM Distance: 4 - 6 cm  Neck ROM: normal range of motion   Mouth opening: Normal     Cardiovascular  Regular rate and rhythm,  S1 and S2 normal,  no murmur, click, rub, or gallop             Dental  No notable dental hx       Pulmonary  Breath sounds clear to auscultation               Abdominal  GI exam deferred       Other Findings            Anesthetic Plan    ASA: 3  Anesthesia type: general          Induction: Intravenous  Anesthetic plan and risks discussed with: Patient

## 2018-08-22 NOTE — INTERVAL H&P NOTE
H&P Update:  Ana Padilla was seen and examined. History and physical has been reviewed. The patient has been examined.  There have been no significant clinical changes since the completion of the originally dated History and Physical.    Signed By: Aaron Parikh MD     August 22, 2018 7:28 AM

## 2018-08-22 NOTE — OP NOTES
Operative Report:      Pre-op diagnosis:KERMIT III  Post-op diagnosis: Same  Procedure: Wide Local Excision of Right Vulva  Surgeon: Kathrin Williamson MD  Assistant: Melba Aguila SA  EBL: Minimal cc  IVFluids:600 cc  Anesthesia: general anesthesia  Findings: Right Vulva with 3 lesions - largest 1.5cm x 1 cm    Procedure:   Patient was taken to the OR after informed consent had been obtained. Surgery identification was completed with the patient and the OR team. She was then placed in the dorsal lithotomy position using the Rosalia Sep. She was prepped and draped  in a sterile fashion. Lesion was marked with marking pen and anesthetized with 12 cc of 0.25 % of marcaine with epinephrine. Using a knife blade the lesion was excised with a knife encompassing all three lesions with good margins. Subcutaneous tissue had good hemostasis with the cautery. Interrupted sutures of 3-0 vicryl was used to re approximate the skin edges. 4-0 Monocryl was used for subcuticular stitch. Derma bond was used on the skin edges. All sponge and instrument counts were correct x 2. Disposition:The patient was awoken from anesthesia and transferred to the recovery room in stable condition.      Kathrin Williamson MD  August 22, 2018

## 2018-08-22 NOTE — DISCHARGE INSTRUCTIONS
[address] [place] [address], [address], [place], [address] [facility] [address]  [phone]      PROCEDURE: Procedure(s):  WIDE EXCISION OF VULVA    Notify [facility] if any of the following occur:     You are unable to urinate. Urgency to urinate is not uncommon.  Excessive vaginal bleeding > 1 maxi pad an hour for more then 2 hours straight.  Temperature above 101.0° and / or chills.  You are nauseous and / or vomiting and you cannot hold down any fluids.  Your pain is not controlled with the pain medication prescribed. Special Considerations:      Do not drive for at least 24 hours after the procedure and until you are no longer taking narcotic pain medication and you are able to move and react without hesitation.  You may return to work in 1 weeks. [x] Pelvic rest (nothing in the vagina) for 3 weeks. [x] No heavy lifting over 10 pounds & no strenuous exercise for 2 weeks. [] Other instructions:         MEDICATIONS: PROVIDED AT DISCHARGE OR PREVIOUSLY PRESCRIBED AT PRE OPERATIVE APPOINTMENT WITH [facility] --  Narcotic Pain Med.   []  Vicodin®   [x] Ultram®   []  Dilaudid®    Non-narcotic Pain Med. [x]   Ibuprofen        Antibiotics   []  Cipro®   []  Keflex®     []  Bactrim DS®       Urgency   []   Vesicare®       Nausea      []    Zofran®     []    Phenergan®       Other []    Colace []    ®          []        []    ®       If you have not already scheduled your post operative appointment please do so soon. Your post operative appointment should be in 2 weeks. Please contact [address] at [phone] or go to the nearest Emergency Department / Urgent Care facility for any other medical questions or concerns.        Venkata Fabian MD  August 22, 2018       DISCHARGE SUMMARY from Nurse    PATIENT INSTRUCTIONS:    After general anesthesia or intravenous sedation, for 24 hours or while taking prescription Narcotics:  · Limit your activities  · Do not drive and operate hazardous machinery  · Do not make important personal or business decisions  · Do  not drink alcoholic beverages  · If you have not urinated within 8 hours after discharge, please contact your surgeon on call. Report the following to your surgeon:  · Excessive pain, swelling, redness or odor of or around the surgical area  · Temperature over 100.5  · Nausea and vomiting lasting longer than 4 hours or if unable to take medications  · Any signs of decreased circulation or nerve impairment to extremity: change in color, persistent  numbness, tingling, coldness or increase pain  · Any questions    What to do at Home:  Recommended activity: Activity as tolerated and no driving for today,       These are general instructions for a healthy lifestyle:    No smoking/ No tobacco products/ Avoid exposure to second hand smoke  Surgeon General's Warning:  Quitting smoking now greatly reduces serious risk to your health. Obesity, smoking, and sedentary lifestyle greatly increases your risk for illness    A healthy diet, regular physical exercise & weight monitoring are important for maintaining a healthy lifestyle    You may be retaining fluid if you have a history of heart failure or if you experience any of the following symptoms:  Weight gain of 3 pounds or more overnight or 5 pounds in a week, increased swelling in our hands or feet or shortness of breath while lying flat in bed. Please call your doctor as soon as you notice any of these symptoms; do not wait until your next office visit. Recognize signs and symptoms of STROKE:    F-face looks uneven    A-arms unable to move or move unevenly    S-speech slurred or non-existent    T-time-call 911 as soon as signs and symptoms begin-DO NOT go       Back to bed or wait to see if you get better-TIME IS BRAIN. Warning Signs of HEART ATTACK     Call 911 if you have these symptoms:   Chest discomfort.  Most heart attacks involve discomfort in the center of the chest that lasts more than a few minutes, or that goes away and comes back. It can feel like uncomfortable pressure, squeezing, fullness, or pain.  Discomfort in other areas of the upper body. Symptoms can include pain or discomfort in one or both arms, the back, neck, jaw, or stomach.  Shortness of breath with or without chest discomfort.  Other signs may include breaking out in a cold sweat, nausea, or lightheadedness. Don't wait more than five minutes to call 911 - MINUTES MATTER! Fast action can save your life. Calling 911 is almost always the fastest way to get lifesaving treatment. Emergency Medical Services staff can begin treatment when they arrive -- up to an hour sooner than if someone gets to the hospital by car. The discharge information has been reviewed with the patient. The patient verbalized understanding. Discharge medications reviewed with the patient and appropriate educational materials and side effects teaching were provided. ___________________________________________________________________________________________________________________________________  Patient armband removed and given to patient to take home.   Patient was informed of the privacy risks if armband lost or stolen

## 2018-08-22 NOTE — PERIOP NOTES
3092  Patient received in PACU and connected to monitors. Vital signs stable. RN at bedside. Will continue to monitor. 0820  Post op blood sugar = 297, medicated per Maryann Krause spoke to patient's family in waiting area re status update and plan of care. Pt tolerating ice water, no c/o pain. 2657   Report given to Bhumi Torres RN in Phase 2 and pt transported to Pod 2.

## 2018-08-22 NOTE — PERIOP NOTES
Phase 2 Recovery Summary  Patient arrived to Phase 2 at 0850  Report received from Tyrese 93:    08/22/18 0824 08/22/18 0829 08/22/18 0844 08/22/18 0851   BP: 105/50 (!) 89/56 128/70 141/73   Pulse: 73 71 78 80   Resp: 12 13 17 14   Temp:       SpO2: 100% 100% 95% 95%   Weight:       Height:           oriented to time, place, person and situation    Lines and Drains  Peripheral Intravenous Line:      Wound  Wound Perineum Right (Active)   DRESSING STATUS Clean, dry, and intact 8/22/2018  8:58 AM   DRESSING TYPE Topical skin adhesive/glue 8/22/2018  8:58 AM   Incision site well approximated? Yes 8/22/2018  8:58 AM   Number of days:0          Patient denied any pain or discomfort and was in good spirits. Discharge instructions given, patient and  voiced understanding. Questions and concerns addressed. Patient stated that she would continue to monitor her blood sugar once she gets home.    Patient discharged to home with   at [address] [place] [address]

## 2018-08-22 NOTE — IP AVS SNAPSHOT
[facility] 
 
 
 [address] 
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 My Medications START taking these medications Instructions Each Dose to Equal  
 Morning Noon Evening Bedtime  
 ibuprofen 600 mg tablet Commonly known as:  MOTRIN Your last dose was: Your next dose is: Take 1 Tab by mouth every six (6) hours as needed for Pain. 600 mg  
    
   
   
   
  
 traMADol 50 mg tablet Commonly known as:  ULTRAM  
   
Your last dose was: Your next dose is: Take 1 Tab by mouth every eight (8) hours as needed for Pain. Max Daily Amount: 150 mg. Indications: Pain 50 mg CONTINUE taking these medications Instructions Each Dose to Equal  
 Morning Noon Evening Bedtime AMARYL PO Your last dose was: Your next dose is:    
   
   
 Amaryl HYZAAR 50-12.5 mg per tablet Generic drug:  losartan-hydroCHLOROthiazide Your last dose was: Your next dose is: Take 1 Tab by mouth daily. Indications: hypertension 1 Tab LASIX 40 mg tablet Generic drug:  furosemide Your last dose was: Your next dose is: Take 40 mg by mouth daily. Indications: Edema, hypertension 40 mg  
    
   
   
   
  
 metFORMIN 1,000 mg tablet Commonly known as:  GLUCOPHAGE Your last dose was: Your next dose is: Take 1,000 mg by mouth two (2) times daily (with meals). 1000 mg OZEMPIC SC Your last dose was: Your next dose is:    
   
   
 by SubCUTAneous route every seven (7) days. Where to Get Your Medications Information on where to get these meds will be given to you by the nurse or doctor. ! Ask your nurse or doctor about these medications  
  ibuprofen 600 mg tablet  
 traMADol 50 mg tablet Opioid Education Prescription Opioids: What You Need to Know: 
 
Prescription opioids can be used to help relieve moderate-to-severe pain and are often prescribed following a surgery or injury, or for certain health conditions. These medications can be an important part of treatment but also come with serious risks. Opioids are strong pain medicines. Examples include hydrocodone, oxycodone, fentanyl, and morphine. Heroin is an example of an illegal opioid. It is important to work with your health care provider to make sure you are getting the safest, most effective care. WHAT ARE THE RISKS AND SIDE EFFECTS OF OPIOID USE? Prescription opioids carry serious risks of addiction and overdose, especially with prolonged use. An opioid overdose, often marked by slow breathing, can cause sudden death. The use of prescription opioids can have a number of side effects as well, even when taken as directed. · Tolerance-meaning you might need to take more of a medication for the same pain relief · Physical dependence-meaning you have symptoms of withdrawal when the medication is stopped.   Withdrawal symptoms can include nausea, sweating, chills, diarrhea, stomach cramps, and muscle aches. Withdrawal can last up to several weeks, depending on which drug you took and how long you took it. · Increased sensitivity to pain · Constipation · Nausea, vomiting, and dry mouth · Sleepiness and dizziness · Confusion · Depression · Low levels of testosterone that can result in lower sex drive, energy, and strength · Itching and sweating RISKS ARE GREATER WITH:      
· History of drug misuse, substance use disorder, or overdose · Mental health conditions (such as depression or anxiety) · Sleep apnea · Older age (72 years or older) · Pregnancy Avoid alcohol while taking prescription opioids. Also, unless specifically advised by your health care provider, medications to avoid include: · Benzodiazepines (such as Xanax or Valium) · Muscle relaxants (such as Soma or Flexeril) · Hypnotics (such as Ambien or Lunesta) · Other prescription opioids KNOW YOUR OPTIONS Talk to your health care provider about ways to manage your pain that don't involve prescription opioids. Some of these options may actually work better and have fewer risks and side effects. Options may include: 
· Pain relievers such as acetaminophen, ibuprofen, and naproxen · Some medications that are also used for depression or seizures · Physical therapy and exercise · Counseling to help patients learn how to cope better with triggers of pain and stress. · Application of heat or cold compress · Massage therapy · Relaxation techniques Be Informed Make sure you know the name of your medication, how much and how often to take it, and its potential risks & side effects. IF YOU ARE PRESCRIBED OPIOIDS FOR PAIN: 
· Never take opioids in greater amounts or more often than prescribed. Remember the goal is not to be pain-free but to manage your pain at a tolerable level. · Follow up with your primary care provider to: · Work together to create a plan on how to manage your pain. · Talk about ways to help manage your pain that don't involve prescription opioids. · Talk about any and all concerns and side effects. · Help prevent misuse and abuse. · Never sell or share prescription opioids · Help prevent misuse and abuse. · Store prescription opioids in a secure place and out of reach of others (this may include visitors, children, friends, and family). · Safely dispose of unused/unwanted prescription opioids: Find your community drug take-back program or your pharmacy mail-back program, or flush them down the toilet, following guidance from the Food and Drug Administration (www.fda.gov/Drugs/ResourcesForYou). · Visit www.cdc.gov/drugoverdose to learn about the risks of opioid abuse and overdose. · If you believe you may be struggling with addiction, tell your health care provider and ask for guidance or call [address] [place] [address] at 3-829-226-ORRF. Discharge Instructions [address] [place] [address], [address], [place] [address] 
[phone] PROCEDURE: Procedure(s): WIDE EXCISION OF VULVA [place] if any of the following occur: ? You are unable to urinate. Urgency to urinate is not uncommon. ? Excessive vaginal bleeding > 1 maxi pad an hour for more then 2 hours straight. ? Temperature above 101.0° and / or chills. ? You are nauseous and / or vomiting and you cannot hold down any fluids. ? Your pain is not controlled with the pain medication prescribed. Special Considerations:  
 
? Do not drive for at least 24 hours after the procedure and until you are no longer taking narcotic pain medication and you are able to move and react without hesitation. ? You may return to work in 1 weeks. [x] Pelvic rest (nothing in the vagina) for 3 weeks. [x] No heavy lifting over 10 pounds & no strenuous exercise for 2 weeks. [] Other instructions: MEDICATIONS: PROVIDED AT DISCHARGE OR PREVIOUSLY PRESCRIBED AT PRE OPERATIVE APPOINTMENT WITH [facility]-- 
Narcotic Pain Med.   []  Vicodin®   [x] Ultram®   []  Dilaudid® Non-narcotic Pain Med. [x]   Ibuprofen Antibiotics   []  Cipro®   []  Keflex®     []  Bactrim DS® Urgency   []   Madhu Remedies Nausea  
   []    Zofran®  
  []    Phenergan® Other []    Colace []    ® []     
  []    ® If you have not already scheduled your post operative appointment please do so soon. Your post operative appointment should be in 2 weeks. Please contact [facility] [address] at [phone] or go to the nearest Emergency Department / Urgent Care facility for any other medical questions or concerns. Brian Jo MD 
August 22, 2018 DISCHARGE SUMMARY from Nurse PATIENT INSTRUCTIONS: 
 
After general anesthesia or intravenous sedation, for 24 hours or while taking prescription Narcotics: · Limit your activities · Do not drive and operate hazardous machinery · Do not make important personal or business decisions · Do  not drink alcoholic beverages · If you have not urinated within 8 hours after discharge, please contact your surgeon on call. Report the following to your surgeon: 
· Excessive pain, swelling, redness or odor of or around the surgical area · Temperature over 100.5 · Nausea and vomiting lasting longer than 4 hours or if unable to take medications · Any signs of decreased circulation or nerve impairment to extremity: change in color, persistent  numbness, tingling, coldness or increase pain · Any questions What to do at Home: 
Recommended activity: Activity as tolerated and no driving for today, These are general instructions for a healthy lifestyle: No smoking/ No tobacco products/ Avoid exposure to second hand smoke Surgeon General's Warning:  Quitting smoking now greatly reduces serious risk to your health. Obesity, smoking, and sedentary lifestyle greatly increases your risk for illness A healthy diet, regular physical exercise & weight monitoring are important for maintaining a healthy lifestyle You may be retaining fluid if you have a history of heart failure or if you experience any of the following symptoms:  Weight gain of 3 pounds or more overnight or 5 pounds in a week, increased swelling in our hands or feet or shortness of breath while lying flat in bed. Please call your doctor as soon as you notice any of these symptoms; do not wait until your next office visit. Recognize signs and symptoms of STROKE: 
 
F-face looks uneven A-arms unable to move or move unevenly S-speech slurred or non-existent T-time-call 911 as soon as signs and symptoms begin-DO NOT go Back to bed or wait to see if you get better-TIME IS BRAIN. Warning Signs of HEART ATTACK Call 911 if you have these symptoms: 
? Chest discomfort. Most heart attacks involve discomfort in the center of the chest that lasts more than a few minutes, or that goes away and comes back. It can feel like uncomfortable pressure, squeezing, fullness, or pain. ? Discomfort in other areas of the upper body. Symptoms can include pain or discomfort in one or both arms, the back, neck, jaw, or stomach. ? Shortness of breath with or without chest discomfort. ? Other signs may include breaking out in a cold sweat, nausea, or lightheadedness. Don't wait more than five minutes to call [address]! Fast action can save your life. Calling 911 is almost always the fastest way to get lifesaving treatment. Emergency Medical Services staff can begin treatment when they arrive  up to an hour sooner than if someone gets to the hospital by car. The discharge information has been reviewed with the patient.   The patient verbalized understanding. Discharge medications reviewed with the patient and appropriate educational materials and side effects teaching were provided. ___________________________________________________________________________________________________________________________________ Patient armband removed and given to patient to take home. Patient was informed of the privacy risks if armband lost or stolen Introducing [facility]! Dear Quiana Santamaria: Thank you for requesting a [employer] account. Our records indicate that you already have an active [employer] account. You can access your account anytime at [email or web address] Did you know that you can access your hospital and ER discharge instructions at any time in [employer]? You can also review all of your test results from your hospital stay or ER visit. Additional Information If you have questions, please visit the Frequently Asked Questions section of the [employer] website at [email or web address] Remember, [employer] is NOT to be used for urgent needs. For medical emergencies, dial 911. Now available from your iPhone and Android! Introducing Steven Rosado As a [facility] patient, I wanted to make you aware of our electronic visit tool called Steven Rosado. [facility] 24/7 allows you to connect within minutes with a medical provider 24 hours a day, seven days a week via a mobile device or tablet or logging into a secure website from your computer. You can access Steven Rosado from anywhere in the [place]. A virtual visit might be right for you when you have a simple condition and feel like you just dont want to get out of bed, or cant get away from work for an appointment, when your regular [facility] provider is not available (evenings, weekends or holidays), or when youre out of town and need minor care.   Electronic visits cost only $49 and if the [facility] 24/7 provider determines a prescription is needed to treat your condition, one can be electronically transmitted to a nearby pharmacy*. Please take a moment to enroll today if you have not already done so. The enrollment process is free and takes just a few minutes. To enroll, please download the [employer] 24/7 sherrie to your tablet or phone, or visit [email or web address] to enroll on your computer. And, as an [address] [place] [address] patient with a [employer] account, the results of your visits will be scanned into your electronic medical record and your primary care provider will be able to view the scanned results. We urge you to continue to see your regular Jacque Fernandes provider for your ongoing medical care. And while your primary care provider may not be the one available when you seek a [employer] virtual visit, the peace of mind you get from getting a real diagnosis real time can be priceless. For more information on [employer], view our Frequently Asked Questions (FAQs) at [email or web address]. Sincerely, 
 
Alee Syed MD 
Chief Medical Officer [facility] Emmanuelle Barrera *:  certain medications cannot be prescribed via [employer] Providers Seen During Your Hospitalization Provider Specialty Primary office phone Estuardo Bray [address] Gynecology [phone] Your Primary Care Physician (PCP) Primary Care Physician Office Phone Office Fax [address], [address] [phone] You are allergic to the following Allergen Reactions Ciprofloxacin Nausea and Vomiting Penicillins Hives Percocet (Oxycodone-Acetaminophen) Nausea and Vomiting Recent Documentation Height Weight BMI OB Status Smoking Status 1.6 m 99.3 kg 38.79 kg/m2 Postmenopausal Former Smoker Emergency Contacts Name Discharge Info Relation Home Work Mobile [address] CAREGIVER [3] Spouse [3]   [phone] Patient Belongings The following personal items are in your possession at time of discharge: 
  Dental Appliances: None  Visual Aid: Glasses      Home Medications: None   Jewelry: None  Clothing: Footwear, Dress, Undergarments, Sweater    Other Valuables: [employer] Please provide this summary of care documentation to your next provider. Signatures-by signing, you are acknowledging that this After Visit Summary has been reviewed with you and you have received a copy. Patient Signature:  ____________________________________________________________ Date:  ____________________________________________________________  
  
[facility] Provider Signature:  ____________________________________________________________ Date:  ____________________________________________________________

## 2018-10-16 ENCOUNTER — HOSPITAL ENCOUNTER (EMERGENCY)
Age: 58
Discharge: HOME | End: 2018-10-17
Attending: EMERGENCY MEDICINE
Payer: COMMERCIAL

## 2018-10-16 ENCOUNTER — APPOINTMENT (OUTPATIENT)
Dept: CT IMAGING | Age: 58
End: 2018-10-16
Attending: EMERGENCY MEDICINE
Payer: COMMERCIAL

## 2018-10-16 DIAGNOSIS — M54.10 RADICULAR SYNDROME OF RIGHT LEG: ICD-10-CM

## 2018-10-16 DIAGNOSIS — G89.29 ABDOMINAL PAIN, CHRONIC, RIGHT LOWER QUADRANT: ICD-10-CM

## 2018-10-16 DIAGNOSIS — R10.2 CHRONIC PELVIC PAIN IN FEMALE: Primary | ICD-10-CM

## 2018-10-16 DIAGNOSIS — D07.1 VIN III (VULVAR INTRAEPITHELIAL NEOPLASIA III): ICD-10-CM

## 2018-10-16 DIAGNOSIS — R10.31 ABDOMINAL PAIN, CHRONIC, RIGHT LOWER QUADRANT: ICD-10-CM

## 2018-10-16 DIAGNOSIS — G89.29 CHRONIC PELVIC PAIN IN FEMALE: Primary | ICD-10-CM

## 2018-10-16 LAB
ALBUMIN SERPL-MCNC: 4.1 G/DL (ref 3.4–5)
ALBUMIN/GLOB SERPL: 1.3 {RATIO} (ref 0.8–1.7)
ALP SERPL-CCNC: 62 U/L (ref 45–117)
ALT SERPL-CCNC: 27 U/L (ref 13–56)
ANION GAP SERPL CALC-SCNC: 10 MMOL/L (ref 3–18)
APPEARANCE UR: CLEAR
AST SERPL-CCNC: 8 U/L (ref 15–37)
BASOPHILS # BLD: 0 K/UL (ref 0–0.1)
BASOPHILS NFR BLD: 0 % (ref 0–2)
BILIRUB SERPL-MCNC: 0.5 MG/DL (ref 0.2–1)
BILIRUB UR QL: NEGATIVE
BUN SERPL-MCNC: 22 MG/DL (ref 7–18)
BUN/CREAT SERPL: 18 (ref 12–20)
CALCIUM SERPL-MCNC: 9.4 MG/DL (ref 8.5–10.1)
CHLORIDE SERPL-SCNC: 98 MMOL/L (ref 100–108)
CO2 SERPL-SCNC: 28 MMOL/L (ref 21–32)
COLOR UR: YELLOW
CREAT SERPL-MCNC: 1.23 MG/DL (ref 0.6–1.3)
DIFFERENTIAL METHOD BLD: ABNORMAL
EOSINOPHIL # BLD: 0.2 K/UL (ref 0–0.4)
EOSINOPHIL NFR BLD: 2 % (ref 0–5)
ERYTHROCYTE [DISTWIDTH] IN BLOOD BY AUTOMATED COUNT: 12.6 % (ref 11.6–14.5)
GLOBULIN SER CALC-MCNC: 3.1 G/DL (ref 2–4)
GLUCOSE SERPL-MCNC: 222 MG/DL (ref 74–99)
GLUCOSE UR STRIP.AUTO-MCNC: 500 MG/DL
HCT VFR BLD AUTO: 39.3 % (ref 35–45)
HGB BLD-MCNC: 13.2 G/DL (ref 12–16)
HGB UR QL STRIP: NEGATIVE
KETONES UR QL STRIP.AUTO: NEGATIVE MG/DL
LEUKOCYTE ESTERASE UR QL STRIP.AUTO: NEGATIVE
LYMPHOCYTES # BLD: 3.8 K/UL (ref 0.9–3.6)
LYMPHOCYTES NFR BLD: 43 % (ref 21–52)
MCH RBC QN AUTO: 29.3 PG (ref 24–34)
MCHC RBC AUTO-ENTMCNC: 33.6 G/DL (ref 31–37)
MCV RBC AUTO: 87.3 FL (ref 74–97)
MONOCYTES # BLD: 0.5 K/UL (ref 0.05–1.2)
MONOCYTES NFR BLD: 6 % (ref 3–10)
NEUTS SEG # BLD: 4.4 K/UL (ref 1.8–8)
NEUTS SEG NFR BLD: 49 % (ref 40–73)
NITRITE UR QL STRIP.AUTO: NEGATIVE
PH UR STRIP: 6 [PH] (ref 5–8)
PLATELET # BLD AUTO: 273 K/UL (ref 135–420)
PMV BLD AUTO: 9 FL (ref 9.2–11.8)
POTASSIUM SERPL-SCNC: 3.8 MMOL/L (ref 3.5–5.5)
PROT SERPL-MCNC: 7.2 G/DL (ref 6.4–8.2)
PROT UR STRIP-MCNC: NEGATIVE MG/DL
RBC # BLD AUTO: 4.5 M/UL (ref 4.2–5.3)
SODIUM SERPL-SCNC: 136 MMOL/L (ref 136–145)
SP GR UR REFRACTOMETRY: 1.01 (ref 1–1.03)
UROBILINOGEN UR QL STRIP.AUTO: 0.2 EU/DL (ref 0.2–1)
WBC # BLD AUTO: 8.8 K/UL (ref 4.6–13.2)

## 2018-10-16 PROCEDURE — 80053 COMPREHEN METABOLIC PANEL: CPT | Performed by: EMERGENCY MEDICINE

## 2018-10-16 PROCEDURE — 74011636320 HC RX REV CODE- 636/320: Performed by: EMERGENCY MEDICINE

## 2018-10-16 PROCEDURE — 96374 THER/PROPH/DIAG INJ IV PUSH: CPT

## 2018-10-16 PROCEDURE — 96375 TX/PRO/DX INJ NEW DRUG ADDON: CPT

## 2018-10-16 PROCEDURE — 74177 CT ABD & PELVIS W/CONTRAST: CPT

## 2018-10-16 PROCEDURE — 99284 EMERGENCY DEPT VISIT MOD MDM: CPT

## 2018-10-16 PROCEDURE — 85025 COMPLETE CBC W/AUTO DIFF WBC: CPT | Performed by: EMERGENCY MEDICINE

## 2018-10-16 PROCEDURE — 81003 URINALYSIS AUTO W/O SCOPE: CPT | Performed by: EMERGENCY MEDICINE

## 2018-10-16 PROCEDURE — 74011250636 HC RX REV CODE- 250/636: Performed by: EMERGENCY MEDICINE

## 2018-10-16 RX ORDER — IBUPROFEN 600 MG/1
600 TABLET ORAL
Qty: 20 TAB | Refills: 0 | Status: SHIPPED | OUTPATIENT
Start: 2018-10-16 | End: 2018-12-04

## 2018-10-16 RX ORDER — MORPHINE SULFATE 4 MG/ML
4 INJECTION INTRAVENOUS
Status: COMPLETED | OUTPATIENT
Start: 2018-10-16 | End: 2018-10-16

## 2018-10-16 RX ORDER — KETOROLAC TROMETHAMINE 30 MG/ML
30 INJECTION, SOLUTION INTRAMUSCULAR; INTRAVENOUS
Status: COMPLETED | OUTPATIENT
Start: 2018-10-16 | End: 2018-10-16

## 2018-10-16 RX ORDER — ONDANSETRON 2 MG/ML
4 INJECTION INTRAMUSCULAR; INTRAVENOUS
Status: COMPLETED | OUTPATIENT
Start: 2018-10-16 | End: 2018-10-16

## 2018-10-16 RX ORDER — TRAMADOL HYDROCHLORIDE 50 MG/1
50 TABLET ORAL
Qty: 20 TAB | Refills: 0 | Status: SHIPPED | OUTPATIENT
Start: 2018-10-16 | End: 2018-12-04

## 2018-10-16 RX ADMIN — IOPAMIDOL 90 ML: 612 INJECTION, SOLUTION INTRAVENOUS at 22:39

## 2018-10-16 RX ADMIN — ONDANSETRON 4 MG: 2 INJECTION, SOLUTION INTRAMUSCULAR; INTRAVENOUS at 21:48

## 2018-10-16 RX ADMIN — MORPHINE SULFATE 4 MG: 4 INJECTION INTRAVENOUS at 21:48

## 2018-10-16 RX ADMIN — KETOROLAC TROMETHAMINE 30 MG: 30 INJECTION, SOLUTION INTRAMUSCULAR at 21:48

## 2018-10-17 VITALS
HEART RATE: 81 BPM | BODY MASS INDEX: 38.8 KG/M2 | SYSTOLIC BLOOD PRESSURE: 143 MMHG | OXYGEN SATURATION: 100 % | TEMPERATURE: 97.6 F | HEIGHT: 63 IN | WEIGHT: 219 LBS | RESPIRATION RATE: 18 BRPM | DIASTOLIC BLOOD PRESSURE: 99 MMHG

## 2018-10-17 NOTE — ED NOTES
I have reviewed discharge instructions with the patient. The patient verbalized understanding. Pt stated pain was 0/10

## 2018-10-17 NOTE — ED TRIAGE NOTES
Pt brought self to ED for c/o pelvic pain that she states has been bothering her since February. Denies any vaginal discharge/bleeding. States she has the sensation to urinate, but is not able to go. Denies hematuria.

## 2018-10-17 NOTE — ED PROVIDER NOTES
HPI Comments: Asaf Lenz is a 62 y.o. Female with c/o worsening right lower abd, pelvic pain since feb. States pain has gotten worse in last few days. Sense of urinary urgency with no dysuria, freq, hematuria. No nv. No diarrhea today. Also with worsening right leg weakness possibly due to compressed nerve root on mri from July No loss of b/b function, fever. Pain worse with movement, palpation. Sharp, stabbing The history is provided by the patient and medical records. Past Medical History:  
Diagnosis Date  Diabetes ([facility] [address]  Hypertension  Seizures ([facility] [address] childhood Past Surgical History:  
Procedure Laterality Date  HX COLONOSCOPY    
 HX TUBAL LIGATION History reviewed. No pertinent family history. Social History Social History  Marital status:  Spouse name: N/A  
 Number of children: N/A  
 Years of education: N/A Occupational History  Not on file. Social History Main Topics  Smoking status: Former Smoker  Smokeless tobacco: Never Used  Alcohol use No  
 Drug use: No  
 Sexual activity: Not on file Other Topics Concern  Not on file Social History Narrative ALLERGIES: Ciprofloxacin; Penicillins; and Percocet [oxycodone-acetaminophen] Review of Systems Constitutional: Negative for fever. HENT: Negative for sore throat and trouble swallowing. Eyes: Negative for visual disturbance. Respiratory: Negative for shortness of breath. Cardiovascular: Negative for chest pain. Gastrointestinal: Positive for abdominal pain. Endocrine: Negative for polyuria. Genitourinary: Positive for pelvic pain. Negative for difficulty urinating. Musculoskeletal: Positive for gait problem. Skin: Negative for rash. Allergic/Immunologic: Negative for immunocompromised state. Neurological: Negative for syncope. Psychiatric/Behavioral: Positive for sleep disturbance. Vitals:  
 10/16/18 2012 BP: (!) 152/92 Pulse: 90 Resp: 16 Temp: 98.4 °F (36.9 °C) SpO2: 98% Weight: 99.3 kg (219 lb) Height: 5' 3\" (1.6 m) Physical Exam  
Constitutional: She is oriented to person, place, and time. She appears well-developed and well-nourished. No distress. HENT:  
Head: Normocephalic and atraumatic. Right Ear: External ear normal.  
Left Ear: External ear normal.  
Nose: Nose normal.  
Mouth/Throat: Uvula is midline, oropharynx is clear and moist and mucous membranes are normal.  
Eyes: Conjunctivae are normal. No scleral icterus. Neck: Neck supple. Cardiovascular: Normal rate, regular rhythm, normal heart sounds and intact distal pulses. Pulmonary/Chest: Effort normal and breath sounds normal.  
Abdominal: Soft. She exhibits no distension and no mass. There is tenderness. There is guarding. There is no rigidity, no rebound and no CVA tenderness. Musculoskeletal: She exhibits no edema. Neurological: She is alert and oriented to person, place, and time. Gait normal.  
Unable to lift right leg against gravity which is chronic Skin: Skin is warm and dry. She is not diaphoretic. Psychiatric: Her behavior is normal.  
Nursing note and vitals reviewed. [facility] 
 
 
ED Course Procedures Vitals: 
Patient Vitals for the past 12 hrs: 
 Temp Pulse Resp BP SpO2  
10/16/18 2012 98.4 °F (36.9 °C) 90 16 (!) 152/92 98 % Medications ordered:  
Medications  
ketorolac (TORADOL) injection 30 mg (30 mg IntraVENous Given 10/16/18 2148) morphine injection 4 mg (4 mg IntraVENous Given 10/16/18 2148) ondansetron [facility]) injection 4 mg (4 mg IntraVENous Given 10/16/18 2148) iopamidol (ISOVUE 300) 61 % contrast injection 100 mL (90 mL IntraVENous Given 10/16/18 2239) Lab findings: 
Recent Results (from the past 12 hour(s)) URINALYSIS W/ RFLX MICROSCOPIC Collection Time: 10/16/18  9:22 PM  
Result Value Ref Range Color YELLOW  Appearance CLEAR    
 Specific gravity 1.012 1.005 - 1.030    
 pH (UA) 6.0 5.0 - 8.0 Protein NEGATIVE  NEG mg/dL Glucose 500 (A) NEG mg/dL Ketone NEGATIVE  NEG mg/dL Bilirubin NEGATIVE  NEG Blood NEGATIVE  NEG Urobilinogen 0.2 0.2 - 1.0 EU/dL Nitrites NEGATIVE  NEG Leukocyte Esterase NEGATIVE  NEG    
CBC WITH AUTOMATED DIFF Collection Time: 10/16/18  9:37 PM  
Result Value Ref Range WBC 8.8 4.6 - 13.2 K/uL  
 RBC 4.50 4. 20 - 5.30 M/uL  
 HGB 13.2 12.0 - 16.0 g/dL HCT 39.3 35.0 - 45.0 % MCV 87.3 74.0 - 97.0 FL  
 MCH 29.3 24.0 - 34.0 PG  
 MCHC 33.6 31.0 - 37.0 g/dL  
 RDW 12.6 11.6 - 14.5 % PLATELET 510 436 - 325 K/uL MPV 9.0 (L) 9.2 - 11.8 FL  
 NEUTROPHILS 49 40 - 73 % LYMPHOCYTES 43 21 - 52 % MONOCYTES 6 3 - 10 % EOSINOPHILS 2 0 - 5 % BASOPHILS 0 0 - 2 %  
 ABS. NEUTROPHILS 4.4 1.8 - 8.0 K/UL  
 ABS. LYMPHOCYTES 3.8 (H) 0.9 - 3.6 K/UL  
 ABS. MONOCYTES 0.5 0.05 - 1.2 K/UL  
 ABS. EOSINOPHILS 0.2 0.0 - 0.4 K/UL  
 ABS. BASOPHILS 0.0 0.0 - 0.1 K/UL  
 DF AUTOMATED METABOLIC PANEL, COMPREHENSIVE Collection Time: 10/16/18  9:37 PM  
Result Value Ref Range Sodium 136 136 - 145 mmol/L Potassium 3.8 3.5 - 5.5 mmol/L Chloride 98 (L) 100 - 108 mmol/L  
 CO2 28 21 - 32 mmol/L Anion gap 10 3.0 - 18 mmol/L Glucose 222 (H) 74 - 99 mg/dL BUN 22 (H) 7.0 - 18 MG/DL Creatinine 1.23 0.6 - 1.3 MG/DL  
 BUN/Creatinine ratio 18 12 - 20 GFR est AA 54 (L) >60 ml/min/1.73m2 GFR est non-AA 45 (L) >60 ml/min/1.73m2 Calcium 9.4 8.5 - 10.1 MG/DL Bilirubin, total 0.5 0.2 - 1.0 MG/DL  
 ALT (SGPT) PENDING U/L  
 AST (SGOT) 8 (L) 15 - 37 U/L Alk. phosphatase 62 45 - 117 U/L Protein, total 7.2 6.4 - 8.2 g/dL Albumin 4.1 3.4 - 5.0 g/dL Globulin 3.1 2.0 - 4.0 g/dL A-G Ratio 1.3 0.8 - 1.7 EKG interpretation by ED Physician: X-Ray, CT or other radiology findings or impressions: 
CT ABD PELV W CONT    (Results Pending) nothing acute on prelim report. Chronic changes Progress notes, Consult notes or additional Procedure notes: No obvious infection, sig lab abnl to warrant further work up 
rec to pt to f/u with neurosurg regarding worsening leg weakness I have discussed with patient and/or family/sig other the results, interpretation of any imaging if performed, suspected diagnosis and treatment plan to include instructions regarding the diagnoses listed to which understanding was expressed with all questions answered Reevaluation of patient:  
stable Disposition: 
Diagnosis: 1. Chronic pelvic pain in female 2. Abdominal pain, chronic, right lower quadrant 3. Radicular syndrome of right leg 4. KERMIT III (vulvar intraepithelial neoplasia III) Disposition: home Follow-up Information Follow up With Details Comments Contact Info Prasad Zhou MD Schedule an appointment as soon as possible for a visit  [address] [facility] [address] [facility] [address] [address] [phone] Milagros Ganser, MD Schedule an appointment as soon as possible for a visit regarding your leg weakness [address] [facility] [address] [facility] [address] [address] [phone] Patient's Medications Start Taking No medications on file Continue Taking FUROSEMIDE (LASIX) 40 MG TABLET    Take 40 mg by mouth daily. Indications: Edema, hypertension GLIMEPIRIDE (AMARYL PO)    Amaryl LOSARTAN-HYDROCHLOROTHIAZIDE (HYZAAR) 50-12.5 MG PER TABLET    Take 1 Tab by mouth daily. Indications: hypertension METFORMIN (GLUCOPHAGE) 1,000 MG TABLET    Take 1,000 mg by mouth two (2) times daily (with meals). SEMAGLUTIDE (OZEMPIC SC)    by SubCUTAneous route every seven (7) days. These Medications have changed Modified Medication Previous Medication IBUPROFEN (MOTRIN) 600 MG TABLET ibuprofen (MOTRIN) 600 mg tablet Take 1 Tab by mouth every six (6) hours as needed for Pain.     Take 1 Tab by mouth every six (6) hours as needed for Pain. TRAMADOL (ULTRAM) 50 MG TABLET traMADol (ULTRAM) 50 mg tablet Take 1 Tab by mouth every eight (8) hours as needed for Pain. Max Daily Amount: 150 mg. Indications: Pain    Take 1 Tab by mouth every eight (8) hours as needed for Pain. Max Daily Amount: 150 mg. Indications: Pain Stop Taking No medications on file

## 2018-10-24 ENCOUNTER — APPOINTMENT (OUTPATIENT)
Dept: PHYSICAL THERAPY | Age: 58
End: 2018-10-24

## 2018-11-02 ENCOUNTER — APPOINTMENT (OUTPATIENT)
Dept: PHYSICAL THERAPY | Age: 58
End: 2018-11-02
Payer: COMMERCIAL

## 2018-11-07 ENCOUNTER — HOSPITAL ENCOUNTER (OUTPATIENT)
Dept: PHYSICAL THERAPY | Age: 58
Discharge: HOME | End: 2018-11-07
Payer: COMMERCIAL

## 2018-11-07 PROCEDURE — 97110 THERAPEUTIC EXERCISES: CPT

## 2018-11-07 PROCEDURE — 97163 PT EVAL HIGH COMPLEX 45 MIN: CPT
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
## 2018-11-12 ENCOUNTER — HOSPITAL ENCOUNTER (OUTPATIENT)
Dept: PHYSICAL THERAPY | Age: 58
End: 2018-11-12
Payer: COMMERCIAL

## 2018-11-20 ENCOUNTER — HOSPITAL ENCOUNTER (OUTPATIENT)
Dept: PHYSICAL THERAPY | Age: 58
Discharge: HOME | End: 2018-11-20
Payer: COMMERCIAL

## 2018-11-20 PROCEDURE — 97110 THERAPEUTIC EXERCISES: CPT

## 2018-11-20 NOTE — PROGRESS NOTES
PHYSICAL THERAPY - DAILY TREATMENT NOTE Patient 22Name: Shama Garcia        Date: 2018 : 1960   YES Patient  Verified Visit #: 2    Insurance: Payor: Vicente Chang / Plan: Alexey Evans PPO / Product Type: PPO / In time: 3:38 Out time: 4:10 Total Treatment Time: 32 Medicare/BCBS [place] Time Tracking (below) Total Timed Codes (min):  na 1:1 Treatment Time:  na  
TREATMENT AREA =  Right LE weakness SUBJECTIVE Pain Level (on 0 to 10 scale):  9  / 10 Medication Changes/New allergies or changes in medical history, any new surgeries or procedures? NO    If yes, update Summary List  
Subjective Functional Status/Changes:  []  No changes reported Pt reports she hasn't slept good for 3 weeks. Pt states that she hasn't been good about doing her exercises as much as she is supposed to (performing 5 reps each), but has been going to the gym with her  and walking in the water. Pt reports feels uncomfortable doing the balance ex's at home, states she is standing on carpet and has her   front of her. OBJECTIVE 24 min Therapeutic Exercise:  [x]  See flow sheet Rationale:    increase ROM, increase strength, improve coordination, improve balance and increase proprioception to promote increased functional mobility and increased activity tolerance with ADL's. 
8 min Therapeutic Activity: Static standing balance EO/EC Rationale:    improve coordination, improve balance and increase proprioception to improve the patients ability to perform ADLs, transfers and gait safely and independently. min Patient Education:  YES  Reviewed HEP []  Progressed/Changed HEP based on:   Discussed with pt performing static standing balance ex in kitchen for solid manuel and having rollator in front of her with brakes on. Discussed importance of daily compliance with HEP to facilitate gains in strength and ROM Other Objective/Functional Measures: Initiated NuStep for warm-up, pt fatigued after 3'. Reviewed form for HEP with pt. Educated pt to perform ex in 2 set of 5 reps to avoid compensatory movement during therapeutic exercise with fatigue. Post Treatment Pain Level (on 0 to 10) scale:   8  / 10 ASSESSMENT Assessment/Changes in Function:  
 
Pt demo's decreased strength with therapeutic exercise as evidenced by compensatory movement with therapeutic exercise. []  See Progress Note/Recertification Patient will continue to benefit from skilled PT services to modify and progress therapeutic interventions, address functional mobility deficits, address ROM deficits, address strength deficits, analyze and cue movement patterns, address imbalance/dizziness and instruct in home and community integration to attain remaining goals. Progress toward goals / Updated goals: 1. Patient will demonstrate compliance with HEP. Pt reports partial compliance with HEP 2. Patient will demonstrate 2/5 right hip flexion strength to facilitate gait/ADLs. 3. Patient will score greater than or equal to 18/28 on Tinetti to indicate improved balance/decreased fall risk. PLAN 
 
[]  Upgrade activities as tolerated YES Continue plan of care  
[]  Discharge due to :   
[]  Other:   
 
Therapist: Sarah Lambert PTA Date: 11/20/2018 Time: 4:52 PM  
 
Future Appointments Date Time Provider Raissa Agee 11/27/2018  3:30 PM [facility]  
12/4/2018  3:30 PM [facility]  
12/5/2018  3:30 PM Jessie Piña, [address] [place] [address]  
12/10/2018  3:30 PM Deepa Abraham, PT [facility]  
12/12/2018  3:30 PM Jessie Piña, [address] [place] [address]  
12/17/2018  3:30 PM Deepa Abraham, PT [facility]  
12/19/2018  3:30 PM Jessie Piña, PT [facility]

## 2018-11-27 ENCOUNTER — HOSPITAL ENCOUNTER (OUTPATIENT)
Dept: PHYSICAL THERAPY | Age: 58
Discharge: HOME | End: 2018-11-27
Payer: COMMERCIAL

## 2018-11-27 PROCEDURE — 97110 THERAPEUTIC EXERCISES: CPT

## 2018-11-27 NOTE — PROGRESS NOTES
PHYSICAL THERAPY - DAILY TREATMENT NOTE Patient Name: Oswald Enciso        Date: 2018 : 1960   YES Patient  Verified Visit #:   3     Insurance: Payor: Rubin Newton / Plan: [place] / Product Type: PPO / In time: 3:40 Out time: 4:05 Total Treatment Time: 25 Medicare/BCBS [place] Time Tracking (below) Total Timed Codes (min):  na 1:1 Treatment Time:  na  
TREATMENT AREA =   Right LE weakness SUBJECTIVE Pain Level (on 0 to 10 scale):  9  / 10 Medication Changes/New allergies or changes in medical history, any new surgeries or procedures? NO    If yes, update Summary List  
Subjective Functional Status/Changes:  []  No changes reported Pt reports she has an appointment in 3 weeks with a gastro doctor about the abdominal pain she has been having since February. Pt states that she has been better about doing her HEP and reports she is still going to the pool and exercising. Pt reports she over did it in the pool the other day and was barely able to get out because her left leg was fatigued. Also reports needing assistance to get out of her hot tub the other day. OBJECTIVE 25 min Therapeutic Exercise:  [x]  See flow sheet Rationale:    increase ROM, increase strength, improve coordination, improve balance and increase proprioception to promote increased functional mobility and increased activity tolerance with ADL's. 
  min Patient Education:  YES  Reviewed HEP [x]  Progressed/Changed HEP based on:   Updated HEP-see copy in chart Other Objective/Functional Measures: 
 
Demo'd and educated pt's spouse on supporting right LE for sidelying therapeutic exercise. Added several LE strengthening ex on mat. Pt demo's difficulty with bed mobility and required some A for proper positioning of right LE. Mod A right LE transferring sit <>supine. Mod VCing for form with therapeutic exercise. Post Treatment Pain Level (on 0 to 10) scale:   9  / 10 ASSESSMENT Assessment/Changes in Function:  
 
Pt demo's decreased functional mobility as evidenced by impaired bed mobility. []  See Progress Note/Recertification Patient will continue to benefit from skilled PT services to modify and progress therapeutic interventions, address functional mobility deficits, address ROM deficits, address strength deficits, analyze and cue movement patterns, address imbalance/dizziness and instruct in home and community integration to attain remaining goals. Progress toward goals / Updated goals: 1. Patient will demonstrate compliance with HEP. Pt reports improved compliance with HEP 2. Patient will demonstrate 2/5 right hip flexion strength to facilitate gait/ADLs. Added SL hip flexion for strengthening in gravity minimized position 3. Patient will score greater than or equal to 18/28 on Tinetti to indicate improved balance/decreased fall risk. PLAN 
 
[]  Upgrade activities as tolerated YES Continue plan of care  
[]  Discharge due to :   
[]  Other:   
 
Therapist: Eitan Apodaca PTA Date: 11/27/2018 Time: 4:48 PM  
 
Future Appointments Date Time Provider Raissa Agee 12/4/2018  3:30 PM Gurjit [facility]  
12/5/2018  3:30 PM Quiana Meléndez, [address] [place] [address]  
12/10/2018  3:30 PM Jaspreet Ferrer, PT [facility]  
12/12/2018  3:30 PM Quiana Meléndez [address] [place] [address]  
12/17/2018  3:30 PM Jaspreet Ferrer PT [facility]  
12/19/2018  3:30 PM Quiana Meléndez PT [facility]

## 2018-12-04 ENCOUNTER — HOSPITAL ENCOUNTER (EMERGENCY)
Age: 58
Discharge: HOME | End: 2018-12-05
Attending: EMERGENCY MEDICINE
Payer: COMMERCIAL

## 2018-12-04 ENCOUNTER — APPOINTMENT (OUTPATIENT)
Dept: CT IMAGING | Age: 58
End: 2018-12-04
Attending: EMERGENCY MEDICINE
Payer: COMMERCIAL

## 2018-12-04 ENCOUNTER — HOSPITAL ENCOUNTER (OUTPATIENT)
Dept: PHYSICAL THERAPY | Age: 58
Discharge: HOME | End: 2018-12-04
Payer: COMMERCIAL

## 2018-12-04 ENCOUNTER — APPOINTMENT (OUTPATIENT)
Dept: GENERAL RADIOLOGY | Age: 58
End: 2018-12-04
Attending: EMERGENCY MEDICINE
Payer: COMMERCIAL

## 2018-12-04 DIAGNOSIS — R33.8 ACUTE URINARY RETENTION: ICD-10-CM

## 2018-12-04 DIAGNOSIS — D07.1 VIN III (VULVAR INTRAEPITHELIAL NEOPLASIA III): ICD-10-CM

## 2018-12-04 DIAGNOSIS — S80.01XA CONTUSION OF RIGHT KNEE, INITIAL ENCOUNTER: ICD-10-CM
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
## 2018-12-04 LAB
ALBUMIN SERPL-MCNC: 4.1 G/DL (ref 3.4–5)
ALBUMIN/GLOB SERPL: 1.2 {RATIO} (ref 0.8–1.7)
ALP SERPL-CCNC: 66 U/L (ref 45–117)
ALT SERPL-CCNC: 33 U/L (ref 13–56)
ANION GAP SERPL CALC-SCNC: 16 MMOL/L (ref 3–18)
AST SERPL-CCNC: 18 U/L (ref 15–37)
BASOPHILS # BLD: 0 K/UL (ref 0–0.1)
BASOPHILS NFR BLD: 0 % (ref 0–2)
BILIRUB SERPL-MCNC: 0.5 MG/DL (ref 0.2–1)
BUN SERPL-MCNC: 18 MG/DL (ref 7–18)
BUN/CREAT SERPL: 15 (ref 12–20)
CALCIUM SERPL-MCNC: 10.1 MG/DL (ref 8.5–10.1)
CHLORIDE SERPL-SCNC: 89 MMOL/L (ref 100–108)
CO2 SERPL-SCNC: 22 MMOL/L (ref 21–32)
CREAT SERPL-MCNC: 1.23 MG/DL (ref 0.6–1.3)
DIFFERENTIAL METHOD BLD: ABNORMAL
EOSINOPHIL # BLD: 0.1 K/UL (ref 0–0.4)
EOSINOPHIL NFR BLD: 1 % (ref 0–5)
ERYTHROCYTE [DISTWIDTH] IN BLOOD BY AUTOMATED COUNT: 12.4 % (ref 11.6–14.5)
GLOBULIN SER CALC-MCNC: 3.4 G/DL (ref 2–4)
GLUCOSE SERPL-MCNC: 297 MG/DL (ref 74–99)
HCT VFR BLD AUTO: 33.5 % (ref 35–45)
HGB BLD-MCNC: 11.4 G/DL (ref 12–16)
LYMPHOCYTES # BLD: 2.2 K/UL (ref 0.9–3.6)
LYMPHOCYTES NFR BLD: 21 % (ref 21–52)
MCH RBC QN AUTO: 29.8 PG (ref 24–34)
MCHC RBC AUTO-ENTMCNC: 34 G/DL (ref 31–37)
MCV RBC AUTO: 87.5 FL (ref 74–97)
MONOCYTES # BLD: 0.7 K/UL (ref 0.05–1.2)
MONOCYTES NFR BLD: 7 % (ref 3–10)
NEUTS SEG # BLD: 7.1 K/UL (ref 1.8–8)
NEUTS SEG NFR BLD: 71 % (ref 40–73)
PLATELET # BLD AUTO: 222 K/UL (ref 135–420)
PMV BLD AUTO: 8.9 FL (ref 9.2–11.8)
POTASSIUM SERPL-SCNC: 3.5 MMOL/L (ref 3.5–5.5)
PROT SERPL-MCNC: 7.5 G/DL (ref 6.4–8.2)
RBC # BLD AUTO: 3.83 M/UL (ref 4.2–5.3)
SODIUM SERPL-SCNC: 127 MMOL/L (ref 136–145)
WBC # BLD AUTO: 10.1 K/UL (ref 4.6–13.2)

## 2018-12-04 PROCEDURE — 74011250636 HC RX REV CODE- 250/636: Performed by: EMERGENCY MEDICINE

## 2018-12-04 PROCEDURE — 96375 TX/PRO/DX INJ NEW DRUG ADDON: CPT

## 2018-12-04 PROCEDURE — 74011250637 HC RX REV CODE- 250/637: Performed by: EMERGENCY MEDICINE

## 2018-12-04 PROCEDURE — 99285 EMERGENCY DEPT VISIT HI MDM: CPT

## 2018-12-04 PROCEDURE — 73610 X-RAY EXAM OF ANKLE: CPT

## 2018-12-04 PROCEDURE — 97530 THERAPEUTIC ACTIVITIES: CPT

## 2018-12-04 PROCEDURE — 96361 HYDRATE IV INFUSION ADD-ON: CPT

## 2018-12-04 PROCEDURE — 73700 CT LOWER EXTREMITY W/O DYE: CPT

## 2018-12-04 PROCEDURE — 96374 THER/PROPH/DIAG INJ IV PUSH: CPT

## 2018-12-04 PROCEDURE — 73564 X-RAY EXAM KNEE 4 OR MORE: CPT

## 2018-12-04 PROCEDURE — 97110 THERAPEUTIC EXERCISES: CPT

## 2018-12-04 PROCEDURE — 80053 COMPREHEN METABOLIC PANEL: CPT

## 2018-12-04 PROCEDURE — 85025 COMPLETE CBC W/AUTO DIFF WBC: CPT

## 2018-12-04 RX ORDER — KETOROLAC TROMETHAMINE 30 MG/ML
30 INJECTION, SOLUTION INTRAMUSCULAR; INTRAVENOUS
Status: COMPLETED | OUTPATIENT
Start: 2018-12-04 | End: 2018-12-04

## 2018-12-04 RX ORDER — TRAMADOL HYDROCHLORIDE 50 MG/1
50 TABLET ORAL
Status: COMPLETED | OUTPATIENT
Start: 2018-12-04 | End: 2018-12-04

## 2018-12-04 RX ORDER — ONDANSETRON 2 MG/ML
4 INJECTION INTRAMUSCULAR; INTRAVENOUS
Status: COMPLETED | OUTPATIENT
Start: 2018-12-04 | End: 2018-12-04

## 2018-12-04 RX ORDER — IBUPROFEN 600 MG/1
600 TABLET ORAL
Qty: 20 TAB | Refills: 0 | Status: SHIPPED | OUTPATIENT
Start: 2018-12-04

## 2018-12-04 RX ORDER — MORPHINE SULFATE 4 MG/ML
4 INJECTION INTRAVENOUS
Status: COMPLETED | OUTPATIENT
Start: 2018-12-04 | End: 2018-12-04

## 2018-12-04 RX ORDER — TRAMADOL HYDROCHLORIDE 50 MG/1
50 TABLET ORAL
Qty: 20 TAB | Refills: 0 | Status: SHIPPED | OUTPATIENT
Start: 2018-12-04

## 2018-12-04 RX ADMIN — KETOROLAC TROMETHAMINE 30 MG: 30 INJECTION, SOLUTION INTRAMUSCULAR at 23:18

## 2018-12-04 RX ADMIN — ONDANSETRON 4 MG: 2 INJECTION INTRAMUSCULAR; INTRAVENOUS at 20:06

## 2018-12-04 RX ADMIN — TRAMADOL HYDROCHLORIDE 50 MG: 50 TABLET, FILM COATED ORAL at 23:18

## 2018-12-04 RX ADMIN — MORPHINE SULFATE 4 MG: 4 INJECTION INTRAVENOUS at 20:08

## 2018-12-04 RX ADMIN — SODIUM CHLORIDE 1000 ML: 900 INJECTION, SOLUTION INTRAVENOUS at 20:01

## 2018-12-04 NOTE — PROGRESS NOTES
PHYSICAL THERAPY - DAILY TREATMENT NOTE Patient Name: Jerome Parr        Date: 2018 : 1960   YES Patient  Verified Visit #:   4     Insurance: Payor: [facility] / Plan: [place] [employer] / Product Type: PPO / In time: 3:45 Out time: 4:20 Total Treatment Time: 35 Medicare/BCBS [place] Time Tracking (below) Total Timed Codes (min):  na 1:1 Treatment Time:  na  
TREATMENT AREA =  Right LE weakness SUBJECTIVE Pain Level (on 0 to 10 scale):  9  / 10 Medication Changes/New allergies or changes in medical history, any new surgeries or procedures? NO    If yes, update Summary List  
Subjective Functional Status/Changes:  []  No changes reported Pt reports late because she fell walking down the stairs on her way here. \"I was holding onto the railing for dear life and my  was trying to get me up. \" Pt reports no pain after fall, states her legs have been giving out on her more even her left one. 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 Pt reports that when she saw the neurologist last, they told her that her psoas didn't look right and pt states that she continues to have abdominal pain and pain in her sides,her gastro MD follow up appointment is 2 weeks away. \"I've been doing the exercises once a day, but I haven't been back to the pool. \" OBJECTIVE 25 min Therapeutic Exercise:  [x]  See flow sheet Rationale:    increase ROM, increase strength, improve coordination, improve balance and increase proprioception to promote increased functional mobility and increased activity tolerance with ADL's. 10 min Therapeutic Activity: Static standing balance exercises with EO/EC  
sit<>Stand Rationale:    improve coordination, improve balance and increase proprioception to improve the patients ability to perform ADLs, transfers and gait safely and independently. min Patient Education:  YES  Reviewed HEP []  Progressed/Changed HEP based on:   Advised pt to follow up with neurologist regarding increased LE buckling and to see if gastro appt could possibly be moved up secondary to c/o continued abdominal pain. Other Objective/Functional Measures: 
 
Pt c/o increased LBP during static standing balance ex. EO stance 30\" EC stance 30\" (+) sway Attempted SAQ's, pt c/o increased LBP, attempted seated lateral UE reach, pt c/o increased abdominal pain. Pt c/o intermittent LBP and abdominal pain with bed mobility, transfers and therapeutic exercise. Added sit<>stand from elevated mat, pt still required B UE A to transfer sit>stand. Pt requires mod A for right LE with bed mobility. Post Treatment Pain Level (on 0 to 10) scale:   9  / 10 ASSESSMENT Assessment/Changes in Function:  
 
Pt reporting increased frequency of LE buckling recently. Therapeutic exercise progression limited secondary to abdominal and LBP. []  See Progress Note/Recertification Patient will continue to benefit from skilled PT services to  modify and progress therapeutic interventions, address functional mobility deficits, address ROM deficits, address strength deficits, analyze and cue movement patterns, address imbalance/dizziness and instruct in home and community integration to attain remaining goals. Progress toward goals / Updated goals: 1. Patient will demonstrate compliance with HEP. Pt reports compliance with HEP 2. Patient will demonstrate 2/5 right hip flexion strength to facilitate gait/ADLs. 3. Patient will score greater than or equal to 18/28 on Tinetti to indicate improved balance/decreased fall risk. continue static standing balance ex PLAN 
 
[]  Upgrade activities as tolerated YES Continue plan of care  
[]  Discharge due to :   
[]  Other: Therapist: London Lara PTA Date: 12/4/2018 Time: 6:16 PM  
 
Future Appointments Date Time Provider Raissa Agee 12/5/2018  3:30 PM Francisco Torres, [facility]  
12/10/2018  3:30 PM Reji Yoo, [facility]  
12/12/2018  3:30 PM Francisco Torres, [address] [place] [address]  
12/17/2018  3:30 PM Reji Yoo, [facility]  
12/19/2018  3:30 PM Francisco Torres [facility]

## 2018-12-05 ENCOUNTER — APPOINTMENT (OUTPATIENT)
Dept: PHYSICAL THERAPY | Age: 58
End: 2018-12-05
Payer: COMMERCIAL

## 2018-12-05 VITALS
RESPIRATION RATE: 14 BRPM | DIASTOLIC BLOOD PRESSURE: 69 MMHG | BODY MASS INDEX: 37.92 KG/M2 | OXYGEN SATURATION: 94 % | HEIGHT: 63 IN | SYSTOLIC BLOOD PRESSURE: 114 MMHG | HEART RATE: 81 BPM | TEMPERATURE: 97.2 F | WEIGHT: 214 LBS

## 2018-12-05 PROCEDURE — 87086 URINE CULTURE/COLONY COUNT: CPT

## 2018-12-05 NOTE — ED PROVIDER NOTES
Chris Sanders is a 62 y.o. Female with c/o fall today injuring right knee and left ankle. States her right knee gave out. Hit head against wall with no loc. No fcs, cp, sob, abd pain. States she has not urinated much today. Pain constant throbbing right knee. Nothing taken. Bruise noted The history is provided by the patient. Past Medical History:  
Diagnosis Date  Diabetes ([facility]  Hypertension  Seizures ([facility] childhood Past Surgical History:  
Procedure Laterality Date  HX COLONOSCOPY    
 HX TUBAL LIGATION History reviewed. No pertinent family history. Social History Socioeconomic History  Marital status:  Spouse name: Not on file  Number of children: Not on file  Years of education: Not on file  Highest education level: Not on file Social Needs  Financial resource strain: Not on file  Food insecurity - worry: Not on file  Food insecurity - inability: Not on file  Transportation needs - medical: Not on file  Transportation needs - non-medical: Not on file Occupational History  Not on file Tobacco Use  Smoking status: Former Smoker  Smokeless tobacco: Never Used Substance and Sexual Activity  Alcohol use: No  
 Drug use: No  
 Sexual activity: Not on file Other Topics Concern  Not on file Social History Narrative  Not on file ALLERGIES: Ciprofloxacin; Penicillins; and Percocet [oxycodone-acetaminophen] Review of Systems Constitutional: Negative for fever. HENT: Negative for sore throat. Eyes: Negative for visual disturbance. Cardiovascular: Negative for chest pain. Gastrointestinal: Negative for abdominal pain. Genitourinary: Positive for difficulty urinating. Musculoskeletal: Positive for arthralgias, gait problem and joint swelling. Skin: Positive for wound. Allergic/Immunologic: Negative for immunocompromised state. Neurological: Negative for syncope. Psychiatric/Behavioral: Positive for sleep disturbance. Vitals:  
 12/04/18 2215 12/04/18 2230 12/04/18 2245 12/04/18 2300 BP: 114/70 124/65 140/75 110/68 Pulse: 94 95 96 93 Resp: 20 19 20 19 Temp:      
SpO2: 98% 97% 97% 97% Weight:      
Height:      
      
 
Physical Exam  
Constitutional: She is oriented to person, place, and time. She appears well-developed and well-nourished. No distress. HENT:  
Head: Normocephalic and atraumatic. Right Ear: External ear normal.  
Left Ear: External ear normal.  
Nose: Nose normal.  
Mouth/Throat: Uvula is midline, oropharynx is clear and moist and mucous membranes are normal.  
Eyes: Conjunctivae are normal. No scleral icterus. Neck: Neck supple. Cardiovascular: Normal rate, regular rhythm, normal heart sounds and intact distal pulses. Pulmonary/Chest: Effort normal and breath sounds normal.  
Abdominal: Soft. There is no tenderness. Musculoskeletal: She exhibits no edema. Right knee: She exhibits decreased range of motion, swelling, effusion, ecchymosis and bony tenderness. She exhibits no deformity, no laceration, no erythema, normal alignment, no LCL laxity, normal patellar mobility, normal meniscus and no MCL laxity. Tenderness found. Medial joint line and lateral joint line tenderness noted. Left ankle: She exhibits normal range of motion, no swelling, no ecchymosis, no deformity, no laceration and normal pulse. Tenderness. Lateral malleolus, AITFL and CF ligament tenderness found. No medial malleolus, no posterior TFL, no head of 5th metatarsal and no proximal fibula tenderness found. Achilles tendon normal.  
Neurological: She is alert and oriented to person, place, and time. Gait normal.  
Skin: Skin is warm and dry. She is not diaphoretic. Psychiatric: Her behavior is normal.  
Nursing note and vitals reviewed. MDM Procedures Vitals: 
Patient Vitals for the past 12 hrs: 
 Temp Pulse Resp BP SpO2 12/04/18 2300  93 19 110/68 97 % 12/04/18 2245  96 20 140/75 97 % 12/04/18 2230  95 19 124/65 97 % 12/04/18 2215  94 20 114/70 98 % 12/04/18 2121     98 % 12/04/18 2100  90 17 143/71 97 % 12/04/18 2045  93 18 141/72 97 % 12/04/18 2000  97 23 (!) 126/111 100 % 12/04/18 1930  98 24 136/83 100 % 12/04/18 1926 97.5 °F (36.4 °C) 98 12 135/78 100 % Medications ordered:  
Medications  
sodium chloride 0.9 % bolus infusion 1,000 mL (0 mL IntraVENous IV Completed 12/4/18 2101) morphine injection 4 mg (4 mg IntraVENous Given 12/4/18 2008)  
ondansetron [facility]) injection 4 mg (4 mg IntraVENous Given 12/4/18 2006)  
ketorolac (TORADOL) injection 30 mg (30 mg IntraVENous Given 12/4/18 2318)  
traMADol (ULTRAM) tablet 50 mg (50 mg Oral Given 12/4/18 2318) Lab findings: 
Recent Results (from the past 12 hour(s)) CBC WITH AUTOMATED DIFF Collection Time: 12/04/18  8:00 PM  
Result Value Ref Range WBC 10.1 4.6 - 13.2 K/uL  
 RBC 3.83 (L) 4.20 - 5.30 M/uL  
 HGB 11.4 (L) 12.0 - 16.0 g/dL HCT 33.5 (L) 35.0 - 45.0 % MCV 87.5 74.0 - 97.0 FL  
 MCH 29.8 24.0 - 34.0 PG  
 MCHC 34.0 31.0 - 37.0 g/dL  
 RDW 12.4 11.6 - 14.5 % PLATELET 456 577 - 907 K/uL MPV 8.9 (L) 9.2 - 11.8 FL  
 NEUTROPHILS 71 40 - 73 % LYMPHOCYTES 21 21 - 52 % MONOCYTES 7 3 - 10 % EOSINOPHILS 1 0 - 5 % BASOPHILS 0 0 - 2 %  
 ABS. NEUTROPHILS 7.1 1.8 - 8.0 K/UL  
 ABS. LYMPHOCYTES 2.2 0.9 - 3.6 K/UL  
 ABS. MONOCYTES 0.7 0.05 - 1.2 K/UL  
 ABS. EOSINOPHILS 0.1 0.0 - 0.4 K/UL  
 ABS. BASOPHILS 0.0 0.0 - 0.1 K/UL  
 DF AUTOMATED METABOLIC PANEL, COMPREHENSIVE Collection Time: 12/04/18  8:00 PM  
Result Value Ref Range Sodium 127 (L) 136 - 145 mmol/L Potassium 3.5 3.5 - 5.5 mmol/L Chloride 89 (L) 100 - 108 mmol/L  
 CO2 22 21 - 32 mmol/L Anion gap 16 3.0 - 18 mmol/L Glucose 297 (H) 74 - 99 mg/dL BUN 18 7.0 - 18 MG/DL  Creatinine 1.23 0.6 - 1.3 MG/DL  
 BUN/Creatinine ratio 15 12 - 20 GFR est AA 54 (L) >60 ml/min/1.73m2 GFR est non-AA 45 (L) >60 ml/min/1.73m2 Calcium 10.1 8.5 - 10.1 MG/DL Bilirubin, total 0.5 0.2 - 1.0 MG/DL  
 ALT (SGPT) 33 13 - 56 U/L  
 AST (SGOT) 18 15 - 37 U/L Alk. phosphatase 66 45 - 117 U/L Protein, total 7.5 6.4 - 8.2 g/dL Albumin 4.1 3.4 - 5.0 g/dL Globulin 3.4 2.0 - 4.0 g/dL A-G Ratio 1.2 0.8 - 1.7 EKG interpretation by ED Physician: X-Ray, CT or other radiology findings or impressions: 
XR KNEE RT MIN 4 V    (Results Pending) XR ANKLE LT MIN 3 V    (Results Pending) CT KNEE RT WO CONT    (Results Pending)  
no fx noted on any of imaging Progress notes, Consult notes or additional Procedure notes:  
Doubt need for other imaging, work up I have discussed with patient and/or family/sig other the results, interpretation of any imaging if performed, suspected diagnosis and treatment plan to include instructions regarding the diagnoses listed to which understanding was expressed with all questions answered Pt was not able to urinate here and noted to have urinary retention without saddle anesthesia. Pt has been seeing neurology and reports intermittent issues with ability to urinate for few weeks. Will leave martines in pending urology f/u Reevaluation of patient:  
stable Disposition: 
Diagnosis: 1. Fall from slip, trip, or stumble, initial encounter 2. Contusion of right knee, initial encounter 3. KERMIT III (vulvar intraepithelial neoplasia III) 4. Acute urinary retention Disposition: home Follow-up Information Follow up With Specialties Details Why Contact Info Festus Ventura MD Orthopedic Surgery Schedule an appointment as soon as possible for a visit  [phone] [facility] [id number] [phone]  Lyn Hoffman MD Urology Schedule an appointment as soon as possible for a visit with this urologist within the next week [address] [address] [facility] [address] 
[phone] Medication List  
  
CONTINUE taking these medications   
ibuprofen 600 mg tablet Commonly known as:  MOTRIN Take 1 Tab by mouth every six (6) hours as needed for Pain. 
  
traMADol 50 mg tablet Commonly known as:  ULTRAM 
Take 1 Tab by mouth every eight (8) hours as needed for Pain. Max Daily Amount: 150 mg. ASK your doctor about these medications AMARYL PO 
  
CYMBALTA PO 
  
GABAPENTIN (BULK) HYZAAR 50-12.5 mg per tablet Generic drug:  losartan-hydroCHLOROthiazide LASIX 40 mg tablet Generic drug:  furosemide 
  
metFORMIN 1,000 mg tablet Commonly known as:  GLUCOPHAGE OZEMPIC SC Where to Get Your Medications Information about where to get these medications is not yet available Ask your nurse or doctor about these medications · ibuprofen 600 mg tablet · traMADol 50 mg tablet

## 2018-12-05 NOTE — ED NOTES
Patient given discharge instructions by Balbir Wooten RN. Patient transported home by [address] [facility] transport at this time

## 2018-12-05 NOTE — ED NOTES
I have reviewed discharge instructions with the patient. The patient verbalized understanding. Patient armband removed and shredded Pt states that she can't get up her stairs and needs medical transport. Pt does not qualify charge nurse Yamile Valentine) is at bedside talking to patient.

## 2018-12-05 NOTE — ED TRIAGE NOTES
Pt brought in by EMS for fall. Pt reported that her \"knees buckled\" and she fell. Normally walked with a walker. Hit head on wall, denies loc, denies being on blood thinners. C/o 9/10 pain right knee & right foot cramping. Pt also reports left ankle hurting.

## 2018-12-07 LAB
BACTERIA SPEC CULT: NORMAL
SERVICE CMNT-IMP: NORMAL

## 2018-12-10 ENCOUNTER — APPOINTMENT (OUTPATIENT)
Dept: PHYSICAL THERAPY | Age: 58
End: 2018-12-10
Payer: COMMERCIAL

## 2018-12-12 ENCOUNTER — APPOINTMENT (OUTPATIENT)
Dept: PHYSICAL THERAPY | Age: 58
End: 2018-12-12
Payer: COMMERCIAL

## 2018-12-17 ENCOUNTER — APPOINTMENT (OUTPATIENT)
Dept: PHYSICAL THERAPY | Age: 58
End: 2018-12-17
Payer: COMMERCIAL

## 2018-12-19 ENCOUNTER — APPOINTMENT (OUTPATIENT)
Dept: PHYSICAL THERAPY | Age: 58
End: 2018-12-19
Payer: COMMERCIAL

## 2018-12-21 ENCOUNTER — APPOINTMENT (OUTPATIENT)
Dept: PHYSICAL THERAPY | Age: 58
End: 2018-12-21
Payer: COMMERCIAL

## 2018-12-24 ENCOUNTER — APPOINTMENT (OUTPATIENT)
Dept: PHYSICAL THERAPY | Age: 58
End: 2018-12-24
Payer: COMMERCIAL

## 2018-12-28 ENCOUNTER — APPOINTMENT (OUTPATIENT)
Dept: PHYSICAL THERAPY | Age: 58
End: 2018-12-28
Payer: COMMERCIAL

## 2018-12-28 NOTE — PROGRESS NOTES
[address] [facility] PHYSICAL THERAPY 
[address], [place], [address] - Phone: [phone]  Fax: [phone] DISCHARGE SUMMARY Patient Name: Carlton Desir : 1960 Treatment/Medical Diagnosis: Right leg weakness [R29.898] Referral Source: Nomi Armas; Dandre Mann MD    
Date of Initial Visit: 2018 Attended Visits: 4 Missed Visits: 5 SUMMARY OF TREATMENT Treatment has consisted of initial evaluation and 3 treatment sessions, which have included LE ROM/strengthening ex, balance training, functional mobility/gait training and patient education (incuding HEP). CURRENT STATUS Patient was last seen in clinic on 2018. Patient cancelled 2018 because she has fallen on 2018 after PT appointment and had gone to ER, where she underwent x-ray and CT of LE, which showed arthritis but no acute injury. Patient stated that she was instructed to follow up with orthopedic surgeon. Patient has cancelled all appointments since going to ER due to awaiting follow up with physician, and inability to safely negotiate stairs to exit 2nd floor apartment. Patient reports that her  has attempted to assist her, but that it is difficult for him. Patient reports that she has seen PCP, but has not yet see orthopedic surgeon. Patient reports that PCP recommended HHPT. Goal/Measure of Progress Goal Met? 1. Patient will demonstrate compliance with HEP. Status at last Eval: NA Current Status: NT no  
2. Patient will demonstrate 2/5 right hip flexion strength to facilitate gait/ADLs. Status at last Eval: 2-/5 Current Status: NT no  
3. Patient will score greater than or equal to 18/28 on Tinetti to indicate improved balance/decreased fall risk. Status at last Eval:  Current Status: NT no  
 
RECOMMENDATIONS Other: Discontinue therapy.   Recommend HHPT as patient unable to get in to outpatient clinic at this time due to inability to safely negotiate stairs. If you have any questions/comments please contact us directly at [phone]. Thank you for allowing us to assist in the care of your patient. Therapist Signature: José Miguel Villalta PT Date: 12/28/2018 Time: 11:28 AM  
 
NOTE TO PHYSICIAN:  PLEASE COMPLETE THE ORDERS BELOW AND FAX TO [facility] Physical Therapy: [phone]. If you are unable to process this request in 24 hours please contact our office: [phone]. 
 
___ I have read the above report and request that my patient be discharged from therapy.   
 
Physician Signature:       Date:      Time:

## 2019-04-18 ENCOUNTER — HOSPITAL ENCOUNTER (OUTPATIENT)
Dept: PHYSICAL THERAPY | Age: 59
Discharge: HOME | End: 2019-04-18
Payer: MEDICAID

## 2019-04-18 PROCEDURE — 97110 THERAPEUTIC EXERCISES: CPT

## 2019-04-18 PROCEDURE — 97162 PT EVAL MOD COMPLEX 30 MIN: CPT
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
## 2019-04-19 ENCOUNTER — HOSPITAL ENCOUNTER (OUTPATIENT)
Dept: MAMMOGRAPHY | Age: 59
Discharge: HOME | End: 2019-04-19
Attending: FAMILY MEDICINE
Payer: MEDICAID

## 2019-04-19 DIAGNOSIS — Z12.31 VISIT FOR SCREENING MAMMOGRAM: ICD-10-CM

## 2019-04-19 PROCEDURE — 77067 SCR MAMMO BI INCL CAD: CPT

## 2019-04-24 ENCOUNTER — APPOINTMENT (OUTPATIENT)
Dept: PHYSICAL THERAPY | Age: 59
End: 2019-04-24
Payer: MEDICAID

## 2019-04-26 ENCOUNTER — APPOINTMENT (OUTPATIENT)
Dept: PHYSICAL THERAPY | Age: 59
End: 2019-04-26
Payer: MEDICAID

## 2019-04-29 ENCOUNTER — HOSPITAL ENCOUNTER (OUTPATIENT)
Dept: PHYSICAL THERAPY | Age: 59
Discharge: HOME | End: 2019-04-29
Payer: MEDICAID

## 2019-04-29 PROCEDURE — 97530 THERAPEUTIC ACTIVITIES: CPT

## 2019-04-29 PROCEDURE — 97110 THERAPEUTIC EXERCISES: CPT

## 2019-04-29 NOTE — PROGRESS NOTES
PHYSICAL THERAPY - DAILY TREATMENT NOTE Patient Name: Tom Juares        Date: 2019 : 1960   YES Patient  Verified Visit #:   2     Insurance: Payor: [address] [place] [address] / Plan:     / Product Type: Medicaid / In time: 4:14 Out time: 4:47 Total Treatment Time: 35 Medicare/BCBS [place] Time Tracking (below) Total Timed Codes (min):  NA 1:1 Treatment Time:  Na  
TREATMENT AREA = B LE weakness SUBJECTIVE Pain Level (on 0 to 10 scale):  7  / 10 Medication Changes/New allergies or changes in medical history, any new surgeries or procedures? yes    If yes, update Summary List  
Subjective Functional Status/Changes:  []  No changes reported \"I don't want to do the stairs today. I'll do them next time. I have to go down 15 steps to leave my house and they wear me out. \" 
'I'm sorry I'm late. The steps make me late. \" \"The doctor started me on Humalog 15 mg.\" \"I don't do the exercises very often. \" OBJECTIVE 23 min Therapeutic Exercise:  [x]  See flow sheet Rationale:      increase ROM, increase strength, improve coordination, improve balance and increase proprioception to improve the patients ability to perform ADL's, gait, and functional mobility with increased safety and independence. 10 min Therapeutic Activity: Step ups Rationale:      increase ROM, increase strength, improve coordination, improve balance and increase proprioception to improve the patients ability to perform ADL's, gait, and functional mobility with increased safety and independence. min Patient Education:  YES  Reviewed HEP []  Progressed/Changed HEP based on: Added hook lying hip abd/add to HEP; educated patient on importance of compliance with HEP. Other Objective/Functional Measures: 
 
Began several LE strengthening exercises per flowsheet. Post Treatment Pain Level (on 0 to 10) scale:   7   10 ASSESSMENT Assessment/Changes in Function:  
 
Requires SBA with step ups for safety. Patient unable to perform heel slides with left LE. 
  
[]  See Progress Note/Recertification Patient will continue to benefit from skilled PT services to modify and progress therapeutic interventions, address functional mobility deficits, address ROM deficits, address strength deficits, analyze and address soft tissue restrictions, analyze and cue movement patterns, analyze and modify body mechanics/ergonomics, assess and modify postural abnormalities, address imbalance/dizziness and instruct in home and community integration to attain remaining goals. Progress toward goals / Updated goals: · Short Term Goals: To be accomplished in  4  weeks: 1. Assess patient's ability to negotiate steps. Patient denied. 2.  Increase Tinetti score to 12/28 to decrease fall risk. 3.  Patient will be compliant with home exercise program. Reports partial compliance with HEP. 4.  Patient will perform sit to stand transfer with min A X 1 to increase independence with transfers. PLAN [x]  Upgrade activities as tolerated YES Continue plan of care  
[]  Discharge due to :   
[]  Other:   
 
Therapist: Halford Kocher, PT Date: 4/29/2019 Time: 9:57 AM  
 
Future Appointments Date Time Provider Raissa Agee 4/29/2019  4:00 PM Lyn Her PT [facility]  
5/6/2019  9:00 AM [address]

## 2019-05-07 ENCOUNTER — HOSPITAL ENCOUNTER (OUTPATIENT)
Dept: PHYSICAL THERAPY | Age: 59
Discharge: HOME | End: 2019-05-07
Payer: MEDICAID

## 2019-05-07 PROCEDURE — 97110 THERAPEUTIC EXERCISES: CPT

## 2019-05-07 NOTE — PROGRESS NOTES
PHYSICAL THERAPY - DAILY TREATMENT NOTE Patient Name: Danii Swain        Date: 2019 : 1960   YES Patient  Verified Visit #:   3     Insurance: Payor: MEDICAID [place] / Plan: 1500 / Product Type: Medicaid / In time: 5:10 Out time: 5:45 Total Treatment Time: 35 Medicare/BCBS [place] Time Tracking (below) Total Timed Codes (min):  na 1:1 Treatment Time:  na  
TREATMENT AREA =  B LE weakness SUBJECTIVE Pain Level (on 0 to 10 scale):  2  / 10 Medication Changes/New allergies or changes in medical history, any new surgeries or procedures? NO    If yes, update Summary List  
Subjective Functional Status/Changes:  []  No changes reported Pt states she did okay after last PT session, wasn't too sore afterward. Pt states she is feeling stronger and able to do more than before. \"I don't want to be on insulin my whole life, but it's helping. \" Pt reports has been working on the exercises at home. OBJECTIVE 30 min Therapeutic Exercise:  [x]  See flow sheet Rationale:     increase ROM, increase strength, improve coordination, improve balance and increase proprioception to promote increased functional mobility and increased activity tolerance with ADL's. 
5 min Therapeutic Activity: Stair negotiation, step to pattern: ascending with right LE leading and descending with left LE leading with gait belt and min A. Rationale:    improve coordination, improve balance and increase proprioception to improve the patients ability to perform ADLs, transfers and gait safely and independently. min Patient Education:  YES  Reviewed HEP [x]  Progressed/Changed HEP based on:   Updated HEP, see copy in chart Other Objective/Functional Measures: 
 
Pt able to ascend/descend 3 steps with B handrail and min A. Cued pt for gait pattern.   
 
Pt with c/o intermittent SI pain with therapeutic exercise and bed mobility. Added SB 1 & 2 for core strengthening. Added heel slides, pt demo's difficulty eccentrically lowering left LE, therapist provided stabilization at knee to keep hip in neutral alignment. Post Treatment Pain Level (on 0 to 10) scale:   2  / 10 ASSESSMENT Assessment/Changes in Function:  
 
Pt demo's ability to negotiate 3 steps with bilateral handrail and min A.  
  
[]  See Progress Note/Recertification Patient will continue to benefit from skilled PT services to modify and progress therapeutic interventions, address functional mobility deficits, address ROM deficits, address strength deficits, analyze and address soft tissue restrictions, analyze and cue movement patterns, analyze and modify body mechanics/ergonomics, assess and modify postural abnormalities, address imbalance/dizziness and instruct in home and community integration to attain remaining goals. Progress toward goals / Updated goals: 1.  Assess patient's ability to negotiate steps. step to gait with min A today 2.  Increase Tinetti score to 12/28 to decrease fall risk. 3.  Patient will be compliant with home exercise program. Reports compliance with HEP. 4.  Patient will perform sit to stand transfer with min A X 1 to increase independence with transfers. PLAN 
 
[]  Upgrade activities as tolerated YES Continue plan of care  
[]  Discharge due to :   
[]  Other:   
 
Therapist: Lm Simms PTA Date: 5/7/2019 Time: 5:18 PM  
 
No future appointments.

## 2019-05-08 ENCOUNTER — HOSPITAL ENCOUNTER (OUTPATIENT)
Dept: PHYSICAL THERAPY | Age: 59
End: 2019-05-08
Payer: MEDICAID

## 2019-05-17 ENCOUNTER — APPOINTMENT (OUTPATIENT)
Dept: PHYSICAL THERAPY | Age: 59
End: 2019-05-17
Payer: MEDICAID

## 2019-05-24 ENCOUNTER — APPOINTMENT (OUTPATIENT)
Dept: PHYSICAL THERAPY | Age: 59
End: 2019-05-24
Payer: MEDICAID

## 2019-05-29 NOTE — PROGRESS NOTES
[address] [facility] PHYSICAL THERAPY  [address] [facility] [place], [address] - Phone: [phone]  Fax: [phone]  DISCHARGE SUMMARY  Patient Name: Anson Garcia : 1960   Treatment/Medical Diagnosis: Bilateral leg weakness [R29.898]   Referral Source: Iker Nolasco MD     Date of Initial Visit: 2019 Attended Visits: 3 Missed Visits: 3     SUMMARY OF TREATMENT  Pt's treatment consisted of LE and core strengthening exercises, pt education and instruction in home exercise program.     CURRENT STATUS  Pt had inconsistent attendance with therapy, only participated in initial evaluation and 2 treatment sessions from 19 to 2019. Pt continued to report difficulty and fatigue with stair negotiation. At patients last session, reported feeling increased strength and demo'd improved functional mobility with transfers and bed mobility. On 19 pt called and requested discharge from Physical Therapy secondary to [place]. RECOMMENDATIONS  Other: Discharge from Physical Therapy per pt request secondary to patient moving. If you have any questions/comments please contact us directly at [phone]. Thank you for allowing us to assist in the care of your patient.     LPTA Signature: Bradford Camarillo PTA Date: 2019   Therapist Signature: Lucas DUMONT Time: 8:18 AM     Physician Signature:        Date:       Time:      To ensure we are able to process the patients encounter and avoid risk of your patient receiving a bill for our services, please sign and return this discharge summary by 2019. (30 days following DC date)

## (undated) DEVICE — DERMABOND SKIN ADH 0.7ML -- DERMABOND ADVANCED 12/BX

## (undated) DEVICE — GOWN,SIRUS,NONRNF,SETINSLV,2XL,18/CS: Brand: MEDLINE

## (undated) DEVICE — DEPAUL MINOR LITHOTOMY CDS: Brand: MEDLINE INDUSTRIES, INC.

## (undated) DEVICE — REM POLYHESIVE ADULT PATIENT RETURN ELECTRODE: Brand: VALLEYLAB

## (undated) DEVICE — SUTURE VCRL SZ 3-0 L27IN ABSRB UD L26MM SH 1/2 CIR J416H

## (undated) DEVICE — (D)SYR 10ML 1/5ML GRAD NSAF -- PKGING CHANGE USE ITEM 338027

## (undated) DEVICE — SUTURE MCRYL SZ 3-0 L27IN ABSRB UD L19MM PS-2 3/8 CIR PRIM Y427H

## (undated) DEVICE — NEEDLE HYPO 25GA L1.5IN BLU POLYPR HUB S STL REG BVL STR

## (undated) DEVICE — KENDALL SCD EXPRESS SLEEVES, KNEE LENGTH, MEDIUM: Brand: KENDALL SCD

## (undated) DEVICE — SOL IRR NACL 0.9% 500ML POUR --